# Patient Record
Sex: FEMALE | Race: ASIAN | NOT HISPANIC OR LATINO
[De-identification: names, ages, dates, MRNs, and addresses within clinical notes are randomized per-mention and may not be internally consistent; named-entity substitution may affect disease eponyms.]

---

## 2019-03-27 ENCOUNTER — APPOINTMENT (OUTPATIENT)
Dept: SURGICAL ONCOLOGY | Facility: CLINIC | Age: 59
End: 2019-03-27
Payer: MEDICAID

## 2019-03-27 VITALS
WEIGHT: 135 LBS | DIASTOLIC BLOOD PRESSURE: 90 MMHG | OXYGEN SATURATION: 96 % | TEMPERATURE: 97.8 F | SYSTOLIC BLOOD PRESSURE: 171 MMHG | BODY MASS INDEX: 25.49 KG/M2 | RESPIRATION RATE: 16 BRPM | HEART RATE: 62 BPM | HEIGHT: 61 IN

## 2019-03-27 DIAGNOSIS — Z78.9 OTHER SPECIFIED HEALTH STATUS: ICD-10-CM

## 2019-03-27 DIAGNOSIS — Z87.39 PERSONAL HISTORY OF OTHER DISEASES OF THE MUSCULOSKELETAL SYSTEM AND CONNECTIVE TISSUE: ICD-10-CM

## 2019-03-27 DIAGNOSIS — Z86.59 PERSONAL HISTORY OF OTHER MENTAL AND BEHAVIORAL DISORDERS: ICD-10-CM

## 2019-03-27 DIAGNOSIS — Z85.828 PERSONAL HISTORY OF OTHER MALIGNANT NEOPLASM OF SKIN: ICD-10-CM

## 2019-03-27 DIAGNOSIS — K31.9 DISEASE OF STOMACH AND DUODENUM, UNSPECIFIED: ICD-10-CM

## 2019-03-27 DIAGNOSIS — Z80.0 FAMILY HISTORY OF MALIGNANT NEOPLASM OF DIGESTIVE ORGANS: ICD-10-CM

## 2019-03-27 DIAGNOSIS — Z86.79 PERSONAL HISTORY OF OTHER DISEASES OF THE CIRCULATORY SYSTEM: ICD-10-CM

## 2019-03-27 PROBLEM — Z00.00 ENCOUNTER FOR PREVENTIVE HEALTH EXAMINATION: Status: ACTIVE | Noted: 2019-03-27

## 2019-03-27 PROCEDURE — 99245 OFF/OP CONSLTJ NEW/EST HI 55: CPT

## 2019-03-27 RX ORDER — PNV NO.95/FERROUS FUM/FOLIC AC 28MG-0.8MG
500-400 TABLET ORAL
Refills: 0 | Status: ACTIVE | COMMUNITY

## 2019-03-27 NOTE — ASSESSMENT
[FreeTextEntry1] : Impression:\par Newly diagnosed angiosarcoma of Right parietal scalp\par \par \par Plan:\par Wide local excision with skin graft closure\par

## 2019-03-27 NOTE — HISTORY OF PRESENT ILLNESS
[de-identified] : Ms. Peralta is a 59 year old female who presents today for an initial consultation.  She  was referred by Dr. Yamilka Ibrahim.\par \par She first noticed a lesion on her parietal scalp approximately 1 year ago denying pruritus or drainage.  Her son prompted her to see a dermatologist after noticing that the lesion felt hard and was tender to palpation.  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  \par \par No significant PMH.\par Denies family history of skin cancers.  Father with history of gastric cancer.\par \par Internist:  Dr. Robinson Ibrahim\par Derm: Rob Peralta

## 2019-03-27 NOTE — PHYSICAL EXAM
[Normal] : supple, no neck mass and thyroid not enlarged [Normal Neck Lymph Nodes] : normal neck lymph nodes  [Normal Supraclavicular Lymph Nodes] : normal supraclavicular lymph nodes [Normal] : oriented to person, place and time, with appropriate affect [FreeTextEntry1] : Deferred  [de-identified] : Right parietal scalp s/p biopsy.   Some residual superiorly. no sign of infection

## 2019-03-27 NOTE — CONSULT LETTER
[Dear  ___] : Dear  [unfilled], [Consult Letter:] : I had the pleasure of evaluating your patient, [unfilled]. [Please see my note below.] : Please see my note below. [Consult Closing:] : Thank you very much for allowing me to participate in the care of this patient.  If you have any questions, please do not hesitate to contact me. [Sincerely,] : Sincerely, [DrMaria G  ___] : Dr. MATA [DrMaria G ___] : Dr. MATA [FreeTextEntry1] : I will keep you informed of the final pathology. [FreeTextEntry3] : Yadiel Calixto MD FACS\par Chief of Surgical Oncology\par \par

## 2019-04-03 ENCOUNTER — OUTPATIENT (OUTPATIENT)
Dept: OUTPATIENT SERVICES | Facility: HOSPITAL | Age: 59
LOS: 1 days | End: 2019-04-03
Payer: COMMERCIAL

## 2019-04-03 DIAGNOSIS — C43.9 MALIGNANT MELANOMA OF SKIN, UNSPECIFIED: ICD-10-CM

## 2019-04-04 ENCOUNTER — OUTPATIENT (OUTPATIENT)
Dept: OUTPATIENT SERVICES | Facility: HOSPITAL | Age: 59
LOS: 1 days | End: 2019-04-04
Payer: COMMERCIAL

## 2019-04-04 ENCOUNTER — RESULT REVIEW (OUTPATIENT)
Age: 59
End: 2019-04-04

## 2019-04-04 ENCOUNTER — APPOINTMENT (OUTPATIENT)
Dept: PLASTIC SURGERY | Facility: CLINIC | Age: 59
End: 2019-04-04
Payer: MEDICAID

## 2019-04-04 VITALS
HEIGHT: 61 IN | SYSTOLIC BLOOD PRESSURE: 100 MMHG | RESPIRATION RATE: 16 BRPM | WEIGHT: 134.92 LBS | HEART RATE: 61 BPM | OXYGEN SATURATION: 99 % | DIASTOLIC BLOOD PRESSURE: 60 MMHG | TEMPERATURE: 99 F

## 2019-04-04 VITALS
WEIGHT: 135 LBS | SYSTOLIC BLOOD PRESSURE: 130 MMHG | HEIGHT: 61 IN | OXYGEN SATURATION: 98 % | TEMPERATURE: 97.8 F | DIASTOLIC BLOOD PRESSURE: 77 MMHG | BODY MASS INDEX: 25.49 KG/M2 | HEART RATE: 59 BPM

## 2019-04-04 DIAGNOSIS — Z98.51 TUBAL LIGATION STATUS: Chronic | ICD-10-CM

## 2019-04-04 DIAGNOSIS — Z01.818 ENCOUNTER FOR OTHER PREPROCEDURAL EXAMINATION: ICD-10-CM

## 2019-04-04 DIAGNOSIS — Z80.1 FAMILY HISTORY OF MALIGNANT NEOPLASM OF TRACHEA, BRONCHUS AND LUNG: ICD-10-CM

## 2019-04-04 DIAGNOSIS — C49.0 MALIGNANT NEOPLASM OF CONNECTIVE AND SOFT TISSUE OF HEAD, FACE AND NECK: ICD-10-CM

## 2019-04-04 PROCEDURE — 88321 CONSLTJ&REPRT SLD PREP ELSWR: CPT

## 2019-04-04 PROCEDURE — 99244 OFF/OP CNSLTJ NEW/EST MOD 40: CPT

## 2019-04-04 PROCEDURE — G0463: CPT

## 2019-04-04 RX ORDER — CHLORHEXIDINE GLUCONATE 213 G/1000ML
1 SOLUTION TOPICAL DAILY
Qty: 0 | Refills: 0 | Status: DISCONTINUED | OUTPATIENT
Start: 2019-04-10 | End: 2019-04-25

## 2019-04-04 RX ORDER — SODIUM CHLORIDE 9 MG/ML
3 INJECTION INTRAMUSCULAR; INTRAVENOUS; SUBCUTANEOUS EVERY 8 HOURS
Qty: 0 | Refills: 0 | Status: DISCONTINUED | OUTPATIENT
Start: 2019-04-10 | End: 2019-04-25

## 2019-04-04 RX ORDER — LIDOCAINE HCL 20 MG/ML
0.2 VIAL (ML) INJECTION ONCE
Qty: 0 | Refills: 0 | Status: DISCONTINUED | OUTPATIENT
Start: 2019-04-10 | End: 2019-04-25

## 2019-04-04 NOTE — H&P PST ADULT - NSICDXFAMILYHX_GEN_ALL_CORE_FT
FAMILY HISTORY:  Family history of gastric cancer, Father @ age 40  Family history of lung cancer, Mother

## 2019-04-04 NOTE — HISTORY OF PRESENT ILLNESS
[FreeTextEntry1] : This is a silas 58 yo F who presents to the office for an initial consultation at the request of Dr. Calixto regarding an angiosarcoma of the right scalp. Pt is accompanied with her son today.\par Pt reports that for about a year, she had a small bump on the right scalp but she did not seek immediate medical treatment due to no symptoms. She had the area biopsied on the right scalp with her Dermatologist in Jan 2019 and was told that it was benign. She states that she developed some redness and pain at the site recently so she had a second biopsy of the area which showed angiosarcoma. She was then referred to Dr. Calixto and will be proceeding with excision. She was referred to plastic surgery for reconstructive options. \par Pt does not take any anticoagulants, she is a non-smoker.\par Pt denies history of skin cancer, denies family history of skin cancer.\par Otherwise, no other complaints or concerns; denies fever, sweats, or chills.

## 2019-04-04 NOTE — CONSULT LETTER
[Dear  ___] : Dear  [unfilled], [Consult Letter:] : I had the pleasure of evaluating your patient, [unfilled]. [( Thank you for referring [unfilled] for consultation for _____ )] : Thank you for referring [unfilled] for consultation for [unfilled] [Please see my note below.] : Please see my note below. [Consult Closing:] : Thank you very much for allowing me to participate in the care of this patient.  If you have any questions, please do not hesitate to contact me. [Sincerely,] : Sincerely, [FreeTextEntry3] : Ryan Valadez MD

## 2019-04-04 NOTE — ASSESSMENT
[FreeTextEntry1] : 58 yo F w/ no pertinent PMH who presents to the office for an initial consultation at the request of Dr. Calixto regarding an angiosarcoma of the right scalp. \par \par I explained that following excision there will be a full thickness defect of the involved area.  The reconstructive options will be based on the defect size and surrounding tissue laxity of the involved area.  Primary closure is only possible for smaller defects.  \par For larger defects, local tissue rearrangement or skin grafting may be necessary.  The patient was explained the risks of each option. Risks following layered primary closure or local tissue rearrangement include wound dehiscence, contour irregularity, bleeding, infection, and paraesthesias.  Risks following skin grafting include wound dehiscence, skin graft nonadherence (partial or complete), contour irregularity, bleeding, infection, paraesthesias, and donor site complications.  All questions were answered; the patient fully understands the risks and plan and would like to proceed with the above.\par \par Surgery will be planned in accordance with Dr. Calixto (surgical oncology); surgical paperwork to be submitted.

## 2019-04-04 NOTE — ADDENDUM
[FreeTextEntry1] : All medical record entries made were at my, ROSA PRESTON MD, direction and personally dictated by me. I have reviewed the chart and agree that the record accurately reflects my personal performance of the history, physical exam, assessment, and plan.

## 2019-04-04 NOTE — REASON FOR VISIT
[Consultation] : a consultation visit [Family Member] : family member [FreeTextEntry1] : Dr. aYdiel Calixto

## 2019-04-04 NOTE — PHYSICAL EXAM
[de-identified] : The patient is ambulatory, well groomed, no signs of cachexia. [de-identified] : Normocephalic, atraumatic. [de-identified] : No conjunctival erythema, hyperemia, or discharge bilaterally; no ectropion, entropion, lagophthalmos, or lid malposition bilaterally. Pupils are equal, round, and reactive to light bilaterally. [de-identified] : There are no masses, scars, or lesions of the external ear. External nose is midline with no scars or masses. Gross hearing intact bilaterally (finger rub). Nasal mucosa has no edema, lesions, or signs of desiccation. Lips and gums have no masses, lesions, friability, or edema. [de-identified] : Neck is soft without edema, masses, or crepitus. Trachea midline. No thyromegaly; no palpable thyroid nodules. [de-identified] : No sign of respiratory distress, no tachypnea, no use of accessory respiratory muscles. [de-identified] : No swelling, tenderness, or varicosities of the extremities bilaterally; extremities are warm to palpation and pedal pulses intact. [de-identified] : No hepatomegaly or splenomegaly. No abdominal wall tenderness or masses on palpation. No abdominal wall hernias noted. [de-identified] : On examination, patient has normal gait and station.\par  [de-identified] : Right Posterior Scalp:\par well healed biopsy sites noted,\par no open areas, no drainage or bleeding,\par no ulceration,\par no pigmentation noted,\par no regional LAD noted.\par  [de-identified] : CN 2-12 are intact, no sensory disturbances noted (e.g. by touch) [de-identified] : The patient is alert and oriented to time, place, and person. No obvious disorder of mood or affect such as anxiety or agitation.

## 2019-04-04 NOTE — H&P PST ADULT - NSANTHOSAYNRD_GEN_A_CORE
No. CAITY screening performed.  STOP BANG Legend: 0-2 = LOW Risk; 3-4 = INTERMEDIATE Risk; 5-8 = HIGH Risk

## 2019-04-04 NOTE — H&P PST ADULT - HISTORY OF PRESENT ILLNESS
This is a 59 year old female with PMH: s/p (1/2019): Excision Posterior Scalp Mass: dx: atypical cells. Wider Excision done (3/2019):dx: Angiosarcoma. Now scheduled: Excision of Sarcoma of Scalp with Skin Graft.

## 2019-04-04 NOTE — H&P PST ADULT - NSICDXPROBLEM_GEN_ALL_CORE_FT
PROBLEM DIAGNOSES  Problem: Angiosarcoma of scalp  Assessment and Plan: Excision of Sarcoma of Scalp with Skin Graft

## 2019-04-04 NOTE — H&P PST ADULT - NSICDXPASTMEDICALHX_GEN_ALL_CORE_FT
PAST MEDICAL HISTORY:  Angiosarcoma of scalp dx: 3/2019    Multiparity      (normal spontaneous vaginal delivery) X 2

## 2019-04-05 PROBLEM — C49.0 MALIGNANT NEOPLASM OF CONNECTIVE AND SOFT TISSUE OF HEAD, FACE AND NECK: Chronic | Status: ACTIVE | Noted: 2019-04-04

## 2019-04-05 LAB — SURGICAL PATHOLOGY STUDY: SIGNIFICANT CHANGE UP

## 2019-04-08 PROBLEM — Z64.1 PROBLEMS RELATED TO MULTIPARITY: Chronic | Status: ACTIVE | Noted: 2019-04-04

## 2019-04-10 ENCOUNTER — OUTPATIENT (OUTPATIENT)
Dept: OUTPATIENT SERVICES | Facility: HOSPITAL | Age: 59
LOS: 1 days | End: 2019-04-10
Payer: COMMERCIAL

## 2019-04-10 ENCOUNTER — APPOINTMENT (OUTPATIENT)
Dept: SURGICAL ONCOLOGY | Facility: HOSPITAL | Age: 59
End: 2019-04-10

## 2019-04-10 ENCOUNTER — RESULT REVIEW (OUTPATIENT)
Age: 59
End: 2019-04-10

## 2019-04-10 VITALS
HEART RATE: 57 BPM | TEMPERATURE: 98 F | OXYGEN SATURATION: 97 % | DIASTOLIC BLOOD PRESSURE: 81 MMHG | RESPIRATION RATE: 16 BRPM | SYSTOLIC BLOOD PRESSURE: 127 MMHG | HEIGHT: 61 IN | WEIGHT: 134.92 LBS

## 2019-04-10 VITALS
OXYGEN SATURATION: 99 % | SYSTOLIC BLOOD PRESSURE: 122 MMHG | DIASTOLIC BLOOD PRESSURE: 65 MMHG | HEART RATE: 88 BPM | RESPIRATION RATE: 22 BRPM

## 2019-04-10 DIAGNOSIS — C49.0 MALIGNANT NEOPLASM OF CONNECTIVE AND SOFT TISSUE OF HEAD, FACE AND NECK: ICD-10-CM

## 2019-04-10 DIAGNOSIS — Z01.818 ENCOUNTER FOR OTHER PREPROCEDURAL EXAMINATION: ICD-10-CM

## 2019-04-10 DIAGNOSIS — Z98.51 TUBAL LIGATION STATUS: Chronic | ICD-10-CM

## 2019-04-10 PROCEDURE — 15200 FTH/GFT FR TRNK 20 SQ CM/<: CPT

## 2019-04-10 PROCEDURE — 88305 TISSUE EXAM BY PATHOLOGIST: CPT

## 2019-04-10 PROCEDURE — 15220 FTH/GFT FR S/A/L 20 SQ CM/<: CPT

## 2019-04-10 PROCEDURE — 15221 FTH/GFT FR S/A/L EACH ADDL: CPT

## 2019-04-10 PROCEDURE — 21015 RESECT FACE/SCALP TUM < 2 CM: CPT

## 2019-04-10 PROCEDURE — 88305 TISSUE EXAM BY PATHOLOGIST: CPT | Mod: 26

## 2019-04-10 PROCEDURE — 11626 EXC S/N/H/F/G MAL+MRG >4 CM: CPT

## 2019-04-10 RX ORDER — ACETAMINOPHEN 500 MG
1000 TABLET ORAL ONCE
Qty: 0 | Refills: 0 | Status: COMPLETED | OUTPATIENT
Start: 2019-04-10 | End: 2019-04-10

## 2019-04-10 RX ORDER — OXYCODONE HYDROCHLORIDE 5 MG/1
10 TABLET ORAL ONCE
Qty: 0 | Refills: 0 | Status: DISCONTINUED | OUTPATIENT
Start: 2019-04-10 | End: 2019-04-10

## 2019-04-10 RX ORDER — CEPHALEXIN 500 MG
1 CAPSULE ORAL
Qty: 14 | Refills: 0
Start: 2019-04-10 | End: 2019-04-16

## 2019-04-10 RX ORDER — CELECOXIB 200 MG/1
200 CAPSULE ORAL ONCE
Qty: 0 | Refills: 0 | Status: DISCONTINUED | OUTPATIENT
Start: 2019-04-10 | End: 2019-04-25

## 2019-04-10 RX ORDER — OXYCODONE HYDROCHLORIDE 5 MG/1
5 TABLET ORAL ONCE
Qty: 0 | Refills: 0 | Status: DISCONTINUED | OUTPATIENT
Start: 2019-04-10 | End: 2019-04-10

## 2019-04-10 RX ORDER — OXYCODONE AND ACETAMINOPHEN 5; 325 MG/1; MG/1
1 TABLET ORAL
Qty: 20 | Refills: 0
Start: 2019-04-10 | End: 2019-04-14

## 2019-04-10 RX ORDER — CELECOXIB 200 MG/1
200 CAPSULE ORAL ONCE
Qty: 0 | Refills: 0 | Status: COMPLETED | OUTPATIENT
Start: 2019-04-10 | End: 2019-04-10

## 2019-04-10 RX ORDER — ONDANSETRON 8 MG/1
4 TABLET, FILM COATED ORAL ONCE
Qty: 0 | Refills: 0 | Status: DISCONTINUED | OUTPATIENT
Start: 2019-04-10 | End: 2019-04-25

## 2019-04-10 RX ORDER — SODIUM CHLORIDE 9 MG/ML
1000 INJECTION, SOLUTION INTRAVENOUS
Qty: 0 | Refills: 0 | Status: DISCONTINUED | OUTPATIENT
Start: 2019-04-10 | End: 2019-04-25

## 2019-04-10 RX ORDER — CEFAZOLIN SODIUM 1 G
2000 VIAL (EA) INJECTION ONCE
Qty: 0 | Refills: 0 | Status: COMPLETED | OUTPATIENT
Start: 2019-04-10 | End: 2019-04-10

## 2019-04-10 RX ADMIN — CELECOXIB 200 MILLIGRAM(S): 200 CAPSULE ORAL at 06:16

## 2019-04-10 RX ADMIN — Medication 1000 MILLIGRAM(S): at 06:16

## 2019-04-10 NOTE — BRIEF OPERATIVE NOTE - NSICDXBRIEFPOSTOP_GEN_ALL_CORE_FT
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

## 2019-04-10 NOTE — ASU DISCHARGE PLAN (ADULT/PEDIATRIC) - ASU DC SPECIAL INSTRUCTIONSFT
1. PLEASE FOLLOW UP WITH DR. PRESTON'S PHYSICIAN ASSISTANT VISHNU NEXT WEEK THURSDAY MORNING 04/18/19. CALL [phone] FOR APPT TIME.  2. DO NOT SLEEP ON THE RIGHT SIDE. THE DRESSING IS VERY IMPORTANT SO PLEASE BE CAREFUL TO NOT MOVE IT. PLEASE KEEP THIS DRY. YOU MAY SHOWER NORMALLY NECK DOWN - MUST KEEP THE SCALP DRY.  3. PLEASE TAKE THE ANTIBIOTICS AND PAIN MEDS AS DIRECTED.  4. YOU MAY HAVE SOME DRAINAGE/OOZING/AND BLOOD FROM SCALP - THIS IS NORMAL.

## 2019-04-10 NOTE — PACU DISCHARGE NOTE - COMMENTS
Patient was advised through son  to follow up with PCP regarding PVCs.   VSS, no symptoms.  If any symptoms to go to ER . Patient/son  understood and agreed.

## 2019-04-10 NOTE — BRIEF OPERATIVE NOTE - NSICDXBRIEFPREOP_GEN_ALL_CORE_FT
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

## 2019-04-10 NOTE — BRIEF OPERATIVE NOTE - OPERATION/FINDINGS
Wide local excision of previously biopsied angiosarcoma of scalp.    Specimens:  1) Sarcoma of scalp, short stitch superior, long stitch left
SCALP WOUND ON RIGHT POSTERIOR S/P EXCISION OF SARCOMA (6X5)  FTSG FROM THE RIGHT GROIN TO SCALP  BOLSTER DRESSING APPLIED

## 2019-04-10 NOTE — ASU DISCHARGE PLAN (ADULT/PEDIATRIC) - CARE PROVIDER_API CALL
Ryan Valadez (MD)  ColonRectal Surgery; Plastic Surgery; Surgery; Surgery of the Hand  [address] [facility], [address]  [place] [address]  Phone: [phone]  Fax: [phone]  Follow Up Time:     Yadiel Calixto)  Surgery  [address] [place] [id number]  Phone: [phone]  Fax: [phone]  Follow Up Time:

## 2019-04-10 NOTE — ASU DISCHARGE PLAN (ADULT/PEDIATRIC) - CALL YOUR DOCTOR IF YOU HAVE ANY OF THE FOLLOWING:
Bleeding that does not stop/Fever greater than (need to indicate Fahrenheit or Celsius)/Swelling that gets worse/Pain not relieved by Medications/Wound/Surgical Site with redness, or foul smelling discharge or pus/Unable to urinate/Excessive diarrhea/[phone]

## 2019-04-10 NOTE — ASU DISCHARGE PLAN (ADULT/PEDIATRIC) - ACTIVITY LEVEL
DO NOT SLEEP ON THE RIGHT SIDE OF HEAD/No heavy lifting/No sports/gym/No excercise/No weight bearing

## 2019-04-10 NOTE — BRIEF OPERATIVE NOTE - NSICDXBRIEFPROCEDURE_GEN_ALL_CORE_FT
PROCEDURES:  Excision of malignant lesion of scalp over 4.0cm in diameter, including margins 10-Apr-2019 07:55:55  Robina Le
PROCEDURES:  Full-thickness skin graft to head or neck 10-Apr-2019 08:35:12  Tiffanie Ocampo

## 2019-04-17 LAB — SURGICAL PATHOLOGY STUDY: SIGNIFICANT CHANGE UP

## 2019-04-18 ENCOUNTER — APPOINTMENT (OUTPATIENT)
Dept: PLASTIC SURGERY | Facility: CLINIC | Age: 59
End: 2019-04-18
Payer: MEDICAID

## 2019-04-18 VITALS
TEMPERATURE: 97.4 F | HEIGHT: 61 IN | WEIGHT: 135 LBS | DIASTOLIC BLOOD PRESSURE: 72 MMHG | HEART RATE: 65 BPM | SYSTOLIC BLOOD PRESSURE: 143 MMHG | BODY MASS INDEX: 25.49 KG/M2 | OXYGEN SATURATION: 99 %

## 2019-04-18 PROCEDURE — 99024 POSTOP FOLLOW-UP VISIT: CPT

## 2019-04-22 NOTE — HISTORY OF PRESENT ILLNESS
[FreeTextEntry1] : 60 yo F who presents for post op visit s/p FTSG reconstruction of right posterior scalp following excision of angiosarcoma DOS: 04/10/19. Pt doing well, here with her son. She denies much pain. Her dressings are all intact, she has kept scalp dry. Otherwise, no other complaints or concerns; denies fever, sweats, chills.

## 2019-04-22 NOTE — ASSESSMENT
[FreeTextEntry1] : 58 yo F who presents for post op visit s/p FTSG reconstruction of right posterior scalp following excision of angiosarcoma DOS: 04/10/19.\par \par FTSG taking well, no sign of infection.\par - Bolster removed\par - Pt may take quick shower, gentle w/ right posterior scalp\par - Leave open to air\par - Follow up in 1 week

## 2019-04-22 NOTE — PHYSICAL EXAM
[de-identified] : Scalp - \par bolster is intact,\par FTSG is viable and taking well,\par Donor site incision is CDI, no erythema, no gaps, no drainage noted. \par No sign of active infection.\par

## 2019-04-23 ENCOUNTER — APPOINTMENT (OUTPATIENT)
Age: 59
End: 2019-04-23
Payer: MEDICAID

## 2019-04-23 VITALS
TEMPERATURE: 98.2 F | SYSTOLIC BLOOD PRESSURE: 124 MMHG | BODY MASS INDEX: 24.84 KG/M2 | HEIGHT: 62 IN | HEART RATE: 63 BPM | WEIGHT: 135 LBS | OXYGEN SATURATION: 97 % | DIASTOLIC BLOOD PRESSURE: 75 MMHG

## 2019-04-23 PROCEDURE — 99024 POSTOP FOLLOW-UP VISIT: CPT

## 2019-04-23 NOTE — HISTORY OF PRESENT ILLNESS
[FreeTextEntry1] : 60 yo F who presents for post op visit s/p FTSG reconstruction of right posterior scalp following excision of angiosarcoma DOS: 04/10/19. Pt doing well, here with her son. Pt has some discomfort and feels the stitches there that are poking the skin. She inquires about shampooing and which ointments she can use. Otherwise, no other complaints or concerns; denies fever, sweats, chills.

## 2019-04-23 NOTE — ASSESSMENT
[FreeTextEntry1] : 58 yo F who presents for post op visit s/p FTSG reconstruction of right posterior scalp following excision of angiosarcoma DOS: 04/10/19.\par \par Pt doing well; incisions are healing well and the FTSG taking well, no sign of infection.\par - Some sutures were trimmed, dissolvable \par - Pt may get area wet now, shampoo/wash normally\par - The patient was encouraged to massage the incision site 2-3 times per day for 8-10 minutes with lotion, vitamin E, or cocoa butter to encourage blood flow and to decrease scar tissue formation.\par - Follow up 2 weeks.

## 2019-04-23 NOTE — PHYSICAL EXAM
[de-identified] : Scalp - \par FTSG is viable and taking well,\par Donor site incision is CDI, no erythema, no gaps, no drainage noted. \par No sign of active infection.\par

## 2019-04-23 NOTE — REASON FOR VISIT
[Post Op: _________] : a [unfilled] post op visit [Family Member] : family member [FreeTextEntry1] : DOS: 04/10/2019 s/p FTSG reconstruction of right posterior scalp following excision of angiosarcoma. Patient alesha right side scalp pain. Patient describes pain 4/10.

## 2019-04-29 ENCOUNTER — APPOINTMENT (OUTPATIENT)
Dept: SURGICAL ONCOLOGY | Facility: CLINIC | Age: 59
End: 2019-04-29
Payer: MEDICAID

## 2019-04-29 VITALS
SYSTOLIC BLOOD PRESSURE: 130 MMHG | OXYGEN SATURATION: 97 % | HEART RATE: 69 BPM | TEMPERATURE: 97.8 F | DIASTOLIC BLOOD PRESSURE: 76 MMHG

## 2019-04-29 PROCEDURE — 99024 POSTOP FOLLOW-UP VISIT: CPT

## 2019-04-29 NOTE — HISTORY OF PRESENT ILLNESS
[de-identified] : Ms. Peralta is a 59 year old female who presents today for an initial post op visit, s/p wide excision of angiosarcoma with FTSG reconstruction (Dr. Valadez) on 4/10/19.   Final pathology revealed a 0.6 cm well differentiated angiosarcoma, mitotic rate 0/10 HPF, negative margins, 0/6 lymph nodes.  Today she is with c/o occasional discomfort at the healing graft site, numbness to the scalp. Denies fever or chills. \par \par \par PERTINENT HISTORY:\par Referred by Dr. Yamilka Ibrahim.  She first noticed a lesion on her parietal scalp approximately 1 year ago denying pruritus or drainage.  Her son prompted her to see a dermatologist after noticing that the lesion felt hard and was tender to palpation.  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  \par \par No significant PMH.\par Denies family history of skin cancers.  Father with history of gastric cancer.\par \par Internist:  Dr. Robinson Ibrahim\par Derm: Rob Peralta

## 2019-04-29 NOTE — PHYSICAL EXAM
[Normal] : well developed, well nourished, in no acute distress [de-identified] : healing right scalp graft site with no evidence of infection

## 2019-04-29 NOTE — CONSULT LETTER
[Dear  ___] : Dear  [unfilled], [Consult Letter:] : I had the pleasure of evaluating your patient, [unfilled]. [Please see my note below.] : Please see my note below. [Consult Closing:] : Thank you very much for allowing me to participate in the care of this patient.  If you have any questions, please do not hesitate to contact me. [Sincerely,] : Sincerely, [FreeTextEntry1] : She will follow-up with you.\par \par I will keep you informed of my follow-up. [FreeTextEntry3] : Yadiel Calixto MD FACS\par Chief of Surgical Oncology\par \par  [DrMaria G  ___] : Dr. MATA [DrMaria G ___] : Dr. MATA

## 2019-04-29 NOTE — ASSESSMENT
[FreeTextEntry1] : Impression:\par Newly diagnosed angiosarcoma of Right parietal scalp\par S/p wide excision of angiosarcoma with FTSG reconstruction (Dr. Valadez) on 4/10/19\par Final path = 0.6 cm well differentiated angiosarcoma, mitotic rate 0/10 HPF, negative margins, 0/6 lymph nodes. \par \par \par Plan:\par Continue f/u with Dr. Valadez\par Follow-up with Dr. Ibrahim\par Radiation evaluation by Dr. Allyn Milligan\par RTO 3 months

## 2019-05-09 ENCOUNTER — APPOINTMENT (OUTPATIENT)
Age: 59
End: 2019-05-09
Payer: MEDICAID

## 2019-05-09 PROCEDURE — 99024 POSTOP FOLLOW-UP VISIT: CPT

## 2019-06-06 NOTE — REASON FOR VISIT
[Initial Consultation] : an initial consultation for [Skin Cancer] : skin caner [Other: _____] : [unfilled] No

## 2019-06-24 NOTE — HISTORY OF PRESENT ILLNESS
[FreeTextEntry1] : 58 yo F who presents for post op visit s/p FTSG reconstruction of right posterior scalp following excision of angiosarcoma DOS: 04/10/19. Pt doing well, here with her son. Pt doing much better, pain and discomfort greatly improved. Massages the scar daily. Otherwise, no other complaints or concerns; denies fever, sweats, chills.

## 2019-06-24 NOTE — PHYSICAL EXAM
[de-identified] : Scalp - \par FTSG is well taken, \par Donor site incision is well healed.\par No sign of active infection.\par

## 2019-06-24 NOTE — ASSESSMENT
[FreeTextEntry1] : 60 yo F who presents for post op visit s/p FTSG reconstruction of right posterior scalp following excision of angiosarcoma DOS: 04/10/19.\par \par Pt doing well; FTSG well taken\par - The patient was encouraged to massage the incision site 2-3 times per day for 8-10 minutes with lotion, vitamin E, or cocoa butter to encourage blood flow and to decrease scar tissue formation.\par - Follow up regularly w/ Dermatologist\par - Follow up PRN

## 2019-07-31 ENCOUNTER — APPOINTMENT (OUTPATIENT)
Dept: SURGICAL ONCOLOGY | Facility: CLINIC | Age: 59
End: 2019-07-31
Payer: MEDICAID

## 2019-07-31 VITALS
DIASTOLIC BLOOD PRESSURE: 82 MMHG | TEMPERATURE: 97.8 F | HEIGHT: 62 IN | RESPIRATION RATE: 16 BRPM | BODY MASS INDEX: 26.13 KG/M2 | SYSTOLIC BLOOD PRESSURE: 145 MMHG | HEART RATE: 62 BPM | OXYGEN SATURATION: 96 % | WEIGHT: 142 LBS

## 2019-07-31 PROCEDURE — 99214 OFFICE O/P EST MOD 30 MIN: CPT

## 2019-07-31 NOTE — HISTORY OF PRESENT ILLNESS
[de-identified] : Ms. Peralta is a 59 year old female who presents today for her follow up visit.  She is s/p wide excision of angiosarcoma with FTSG reconstruction (Dr. Valadez) on 4/10/19.   Final pathology revealed a 0.6 cm well differentiated angiosarcoma, mitotic rate 0/10 HPF, negative margins, 0/6 lymph nodes. \par She completed XRT on 7/12/19 under the guidance of Dr. Steve Milligan.  Today she feels generally well and continues to experience numbness to the right scalp.\par \par PERTINENT HISTORY:\par Referred by Dr. Yamilka Ibrahim.  She first noticed a lesion on her parietal scalp approximately 1 year ago denying pruritus or drainage.  Her son prompted her to see a dermatologist after noticing that the lesion felt hard and was tender to palpation.  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  \par \par No significant PMH.\par Denies family history of skin cancers.  Father with history of gastric cancer.\par \par Internist:  Dr. Yaritza Ibrahim [phone]\par Derm: Rob Peralta

## 2019-07-31 NOTE — ASSESSMENT
[FreeTextEntry1] : Impression:\par [place] - S/p wide excision of angiosarcoma of scalp with FTSG reconstruction (Dr. Valadez) on 4/10/19\par Completed XRT 7/12/19\par \par Plan:\par CT chest Oct 2019; then q 6months\par RTO in 3 months\par \par

## 2019-07-31 NOTE — PHYSICAL EXAM
[Normal] : supple, no neck mass and thyroid not enlarged [Normal Supraclavicular Lymph Nodes] : normal supraclavicular lymph nodes [Normal] : oriented to person, place and time, with appropriate affect [Normal Neck Lymph Nodes] : normal neck lymph nodes  [de-identified] : Right lower posterior scalp excision site well healed without signs of recurrence.  Mild hyperpigmentation changes secondary to XRT. [FreeTextEntry1] : Deferred

## 2019-07-31 NOTE — REASON FOR VISIT
[Follow-Up Visit] : a follow-up visit for [Family Member] : family member [FreeTextEntry2] : angiosarcoma of scalp.

## 2019-10-08 ENCOUNTER — FORM ENCOUNTER (OUTPATIENT)
Age: 59
End: 2019-10-08

## 2019-10-09 ENCOUNTER — OUTPATIENT (OUTPATIENT)
Dept: OUTPATIENT SERVICES | Facility: HOSPITAL | Age: 59
LOS: 1 days | End: 2019-10-09
Payer: COMMERCIAL

## 2019-10-09 ENCOUNTER — APPOINTMENT (OUTPATIENT)
Dept: CT IMAGING | Facility: IMAGING CENTER | Age: 59
End: 2019-10-09
Payer: MEDICAID

## 2019-10-09 DIAGNOSIS — C49.0 MALIGNANT NEOPLASM OF CONNECTIVE AND SOFT TISSUE OF HEAD, FACE AND NECK: ICD-10-CM

## 2019-10-09 DIAGNOSIS — Z98.51 TUBAL LIGATION STATUS: Chronic | ICD-10-CM

## 2019-10-09 PROCEDURE — 71260 CT THORAX DX C+: CPT

## 2019-10-09 PROCEDURE — 71260 CT THORAX DX C+: CPT | Mod: 26

## 2019-10-30 ENCOUNTER — APPOINTMENT (OUTPATIENT)
Dept: SURGICAL ONCOLOGY | Facility: CLINIC | Age: 59
End: 2019-10-30
Payer: MEDICAID

## 2019-10-30 VITALS
WEIGHT: 142 LBS | HEIGHT: 62 IN | TEMPERATURE: 97.8 F | OXYGEN SATURATION: 96 % | HEART RATE: 62 BPM | DIASTOLIC BLOOD PRESSURE: 71 MMHG | RESPIRATION RATE: 16 BRPM | BODY MASS INDEX: 26.13 KG/M2 | SYSTOLIC BLOOD PRESSURE: 118 MMHG

## 2019-10-30 PROCEDURE — 99214 OFFICE O/P EST MOD 30 MIN: CPT

## 2019-10-30 NOTE — HISTORY OF PRESENT ILLNESS
[de-identified] : Ms. Peralta is a 59 year old female who presents today for her follow up visit.  She is s/p wide excision of angiosarcoma with FTSG reconstruction (Dr. Valadez) on 4/10/19.   Final pathology revealed a 0.6 cm well differentiated angiosarcoma, mitotic rate 0/10 HPF, negative margins, 0/6 lymph nodes. \par She completed XRT on 7/12/19 under the guidance of Dr. Steve Milligan.  Today she feels generally well and continues to experience numbness to the right scalp.\par \par CT chest 10/9/19 without signs of metastatic disease.  Today she feels generally well without complaint.\par She underwent a endoscopy/ colonoscopy with Dr. Payne and was noted with H-pylori.  She has yet to start taking her regimen as she is currently taking Augmentin for sinusitis. \par \par PERTINENT HISTORY:\par Referred by Dr. Yamilka Ibrahim.  She first noticed a lesion on her parietal scalp approximately 1 year ago denying pruritus or drainage.  Her son prompted her to see a dermatologist after noticing that the lesion felt hard and was tender to palpation.  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  \par \par No significant PMH.\par Denies family history of skin cancers.  Father with history of gastric cancer.\par \par Internist:  Dr. Yaritza Ibrahim [phone]\par Derm: Rob Peralta

## 2019-10-30 NOTE — CONSULT LETTER
[Dear  ___] : Dear  [unfilled], [Courtesy Letter:] : I had the pleasure of seeing your patient, [unfilled], in my office today. [Please see my note below.] : Please see my note below. [Consult Closing:] : Thank you very much for allowing me to participate in the care of this patient.  If you have any questions, please do not hesitate to contact me. [Sincerely,] : Sincerely, [FreeTextEntry1] : I will keep you informed of my follow-up. [FreeTextEntry3] : Yadiel Calixto MD FACS\par Chief of Surgical Oncology\par \par  [DrMaria G  ___] : Dr. MATA [DrMaria G ___] : Dr. MATA

## 2019-10-30 NOTE — PHYSICAL EXAM
[Normal] : supple, no neck mass and thyroid not enlarged [Normal Neck Lymph Nodes] : normal neck lymph nodes  [Normal Supraclavicular Lymph Nodes] : normal supraclavicular lymph nodes [Normal] : oriented to person, place and time, with appropriate affect [de-identified] : Right posterior lower scalp without signs of local recurrence.

## 2019-10-30 NOTE — ASSESSMENT
[FreeTextEntry1] : Impression:\par [place] - S/p wide excision of angiosarcoma of scalp with FTSG reconstruction (Dr. Valadez) on 4/10/19\par Completed XRT 7/12/19\par \par Plan:\par RTO q 6 months \par CT chest q6 months x 2 years (next April 2020)\par

## 2020-07-06 ENCOUNTER — APPOINTMENT (OUTPATIENT)
Dept: CT IMAGING | Facility: IMAGING CENTER | Age: 60
End: 2020-07-06
Payer: COMMERCIAL

## 2020-07-06 ENCOUNTER — OUTPATIENT (OUTPATIENT)
Dept: OUTPATIENT SERVICES | Facility: HOSPITAL | Age: 60
LOS: 1 days | End: 2020-07-06
Payer: COMMERCIAL

## 2020-07-06 ENCOUNTER — RESULT REVIEW (OUTPATIENT)
Age: 60
End: 2020-07-06

## 2020-07-06 DIAGNOSIS — Z98.51 TUBAL LIGATION STATUS: Chronic | ICD-10-CM

## 2020-07-06 DIAGNOSIS — C49.0 MALIGNANT NEOPLASM OF CONNECTIVE AND SOFT TISSUE OF HEAD, FACE AND NECK: ICD-10-CM

## 2020-07-06 PROCEDURE — 82565 ASSAY OF CREATININE: CPT

## 2020-07-06 PROCEDURE — 71260 CT THORAX DX C+: CPT

## 2020-07-06 PROCEDURE — 71260 CT THORAX DX C+: CPT | Mod: 26

## 2020-08-04 ENCOUNTER — APPOINTMENT (OUTPATIENT)
Dept: SURGICAL ONCOLOGY | Facility: CLINIC | Age: 60
End: 2020-08-04
Payer: COMMERCIAL

## 2020-08-04 VITALS
HEIGHT: 62 IN | WEIGHT: 142 LBS | OXYGEN SATURATION: 95 % | SYSTOLIC BLOOD PRESSURE: 156 MMHG | BODY MASS INDEX: 26.13 KG/M2 | DIASTOLIC BLOOD PRESSURE: 78 MMHG | HEART RATE: 61 BPM

## 2020-08-04 PROCEDURE — 99214 OFFICE O/P EST MOD 30 MIN: CPT

## 2020-08-04 NOTE — HISTORY OF PRESENT ILLNESS
[de-identified] : Ms. Peralta is a 60 year old female who presents today for her follow up visit.  \par \par Initially presented for consultation on 3/27/19 referred by Dr. Yamilka Ibrahim.  She first noticed a lesion on her parietal scalp approximately 1 year ago denying pruritus or drainage.  Her son prompted her to see a dermatologist after noticing that the lesion felt hard and was tender to palpation.  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  \par \par CT scan from 7/6/2020 found no evidece of metastatic disease.\par \par No significant PMH.\par Denies family history of skin cancers.  Father with history of gastric cancer.\par \par Internist:  Dr. Yaritza Ibrahim [phone]\par Derm: Rob Peralta

## 2020-08-04 NOTE — PHYSICAL EXAM
[Normal] : supple, no neck mass and thyroid not enlarged [Normal Supraclavicular Lymph Nodes] : normal supraclavicular lymph nodes [Normal Neck Lymph Nodes] : normal neck lymph nodes  [Normal] : oriented to person, place and time, with appropriate affect [de-identified] : Right posterior lower scalp without signs of local recurrence.

## 2020-08-04 NOTE — ASSESSMENT
[FreeTextEntry1] : Impression:\par [place] - S/p wide excision of angiosarcoma of scalp with FTSG reconstruction (Dr. Valadez) on 4/10/19\par Completed XRT 7/12/19\par CT chest July 2020 without evidence of disease\par \par Plan:\par RTO q 6 months \par CT chest q6 months x 2 years (next Jan 2021) - ordered\par

## 2021-01-05 ENCOUNTER — RESULT REVIEW (OUTPATIENT)
Age: 61
End: 2021-01-05

## 2021-01-05 ENCOUNTER — OUTPATIENT (OUTPATIENT)
Dept: OUTPATIENT SERVICES | Facility: HOSPITAL | Age: 61
LOS: 1 days | End: 2021-01-05
Payer: COMMERCIAL

## 2021-01-05 ENCOUNTER — APPOINTMENT (OUTPATIENT)
Dept: CT IMAGING | Facility: IMAGING CENTER | Age: 61
End: 2021-01-05
Payer: COMMERCIAL

## 2021-01-05 DIAGNOSIS — C49.0 MALIGNANT NEOPLASM OF CONNECTIVE AND SOFT TISSUE OF HEAD, FACE AND NECK: ICD-10-CM

## 2021-01-05 DIAGNOSIS — Z98.51 TUBAL LIGATION STATUS: Chronic | ICD-10-CM

## 2021-01-05 PROCEDURE — 82565 ASSAY OF CREATININE: CPT

## 2021-01-05 PROCEDURE — 71260 CT THORAX DX C+: CPT

## 2021-01-05 PROCEDURE — 71260 CT THORAX DX C+: CPT | Mod: 26

## 2021-01-25 ENCOUNTER — APPOINTMENT (OUTPATIENT)
Dept: SURGICAL ONCOLOGY | Facility: CLINIC | Age: 61
End: 2021-01-25
Payer: COMMERCIAL

## 2021-01-25 VITALS
RESPIRATION RATE: 16 BRPM | BODY MASS INDEX: 26.31 KG/M2 | TEMPERATURE: 98 F | SYSTOLIC BLOOD PRESSURE: 145 MMHG | HEART RATE: 40 BPM | HEIGHT: 62 IN | WEIGHT: 143 LBS | DIASTOLIC BLOOD PRESSURE: 58 MMHG | OXYGEN SATURATION: 96 %

## 2021-01-25 PROCEDURE — 99072 ADDL SUPL MATRL&STAF TM PHE: CPT

## 2021-01-25 PROCEDURE — 99214 OFFICE O/P EST MOD 30 MIN: CPT

## 2021-01-25 NOTE — PHYSICAL EXAM
[Normal] : supple, no neck mass and thyroid not enlarged [Normal Neck Lymph Nodes] : normal neck lymph nodes  [Normal Supraclavicular Lymph Nodes] : normal supraclavicular lymph nodes [Normal] : oriented to person, place and time, with appropriate affect [de-identified] : well healed right posterior scalp graft site with no evidence of recurrence

## 2021-01-25 NOTE — HISTORY OF PRESENT ILLNESS
[de-identified] : Ms. Peralta is a 60 year old female who presents today for her 6 month follow up visit.   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  Pt. completed adjuvant XRT in July 2019. \par \par CT Chest 1/5/2021- [place]\par \par Denies new skin lesions, masses or bleeding/pruritic moles.  She states the healed area is occasionally itchy.   She has not seen dermatology for a full skin check. \par \par PERTINENT HISTORY:\par Initially presented for consultation on 3/27/19 referred by Dr. Yamilka Ibrahim.  She first noticed a lesion on her parietal scalp approximately 1 year ago denying pruritus or drainage.  Her son prompted her to see a dermatologist after noticing that the lesion felt hard and was tender to palpation.  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  \par \par CT scan from 7/6/2020 found no evidence of metastatic disease.\par \par No significant PMH.\par Denies family history of skin cancers.  Father with history of gastric cancer.\par \par Internist:  Dr. Yaritza Ibrahim [phone]\par Derm: Rob Peralta

## 2021-01-25 NOTE — ASSESSMENT
[FreeTextEntry1] : Impression:\par [place] - S/p wide excision of angiosarcoma of scalp with FTSG reconstruction (Dr. Valadez) on 4/10/19\par Completed XRT 7/12/19\par CT chest January 2021 without evidence of disease\par \par Plan:\par RTO q 6 months \par CT chest July, 2021 then CT yearly \par

## 2021-02-09 ENCOUNTER — APPOINTMENT (OUTPATIENT)
Dept: SURGICAL ONCOLOGY | Facility: CLINIC | Age: 61
End: 2021-02-09

## 2021-07-13 ENCOUNTER — APPOINTMENT (OUTPATIENT)
Dept: CT IMAGING | Facility: IMAGING CENTER | Age: 61
End: 2021-07-13
Payer: COMMERCIAL

## 2021-07-13 ENCOUNTER — OUTPATIENT (OUTPATIENT)
Dept: OUTPATIENT SERVICES | Facility: HOSPITAL | Age: 61
LOS: 1 days | End: 2021-07-13
Payer: COMMERCIAL

## 2021-07-13 DIAGNOSIS — C49.0 MALIGNANT NEOPLASM OF CONNECTIVE AND SOFT TISSUE OF HEAD, FACE AND NECK: ICD-10-CM

## 2021-07-13 DIAGNOSIS — Z98.51 TUBAL LIGATION STATUS: Chronic | ICD-10-CM

## 2021-07-13 PROCEDURE — 82565 ASSAY OF CREATININE: CPT

## 2021-07-13 PROCEDURE — 71260 CT THORAX DX C+: CPT | Mod: 26

## 2021-07-13 PROCEDURE — 71260 CT THORAX DX C+: CPT

## 2021-08-10 ENCOUNTER — APPOINTMENT (OUTPATIENT)
Dept: SURGICAL ONCOLOGY | Facility: CLINIC | Age: 61
End: 2021-08-10
Payer: COMMERCIAL

## 2021-08-10 VITALS
DIASTOLIC BLOOD PRESSURE: 73 MMHG | HEIGHT: 62 IN | WEIGHT: 143 LBS | SYSTOLIC BLOOD PRESSURE: 145 MMHG | OXYGEN SATURATION: 99 % | TEMPERATURE: 98.9 F | BODY MASS INDEX: 26.31 KG/M2 | HEART RATE: 62 BPM

## 2021-08-10 PROCEDURE — 99214 OFFICE O/P EST MOD 30 MIN: CPT

## 2021-08-10 NOTE — PHYSICAL EXAM
[Normal] : supple, no neck mass and thyroid not enlarged [Normal Neck Lymph Nodes] : normal neck lymph nodes  [Normal Supraclavicular Lymph Nodes] : normal supraclavicular lymph nodes [Normal] : oriented to person, place and time, with appropriate affect [de-identified] : Skin graft of posterior scalp  without any local recurrence

## 2021-08-10 NOTE — ASSESSMENT
[FreeTextEntry1] : Impression:\par [place] - S/p wide excision of angiosarcoma of scalp with FTSG reconstruction (Dr. Valadez) on 4/10/19\par Completed XRT 7/12/19\par CT chest July 2021 without evidence of disease\par \par Plan:\par RTO q 6 months \par CT chest yearly (July 2022)\par

## 2021-08-10 NOTE — HISTORY OF PRESENT ILLNESS
[de-identified] : Ms. Peralta is a 61 year old female who presents today for her 6 month follow up visit.   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  Pt. completed adjuvant XRT in July 2019. \par \par CT Chest 1/5/2021- [place]\par CT Chest 7/13/21: No intrathoracic metastatic disease. No change from prior.\par \par \par Denies new skin lesions, masses or bleeding/pruritic moles.  She states the healed area is occasionally itchy.   She has not seen dermatology for a full skin check. \par \par PERTINENT HISTORY:\par Initially presented for consultation on 3/27/19 referred by Dr. Yamilka Ibrahim.  She first noticed a lesion on her parietal scalp approximately 1 year ago denying pruritus or drainage.  Her son prompted her to see a dermatologist after noticing that the lesion felt hard and was tender to palpation.  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  \par \par CT scan from 7/6/2020 found no evidence of metastatic disease.\par \par No significant PMH.\par Denies family history of skin cancers.  Father with history of gastric cancer.\par \par Internist:  Dr. Yaritza Ibrahim [phone]\par Derm: Rob Peralta

## 2022-02-15 ENCOUNTER — APPOINTMENT (OUTPATIENT)
Dept: SURGICAL ONCOLOGY | Facility: CLINIC | Age: 62
End: 2022-02-15
Payer: SELF-PAY

## 2022-02-15 VITALS
WEIGHT: 141 LBS | BODY MASS INDEX: 25.95 KG/M2 | SYSTOLIC BLOOD PRESSURE: 126 MMHG | HEIGHT: 62 IN | HEART RATE: 65 BPM | DIASTOLIC BLOOD PRESSURE: 68 MMHG

## 2022-02-15 VITALS — TEMPERATURE: 97.1 F

## 2022-02-15 PROCEDURE — 99214 OFFICE O/P EST MOD 30 MIN: CPT

## 2022-02-15 NOTE — HISTORY OF PRESENT ILLNESS
[de-identified] : Ms. Peralta is a 61 year old female who presents today for her 6 month follow up visit.   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  Pt. completed adjuvant XRT in July 2019. \par \par CT Chest 1/5/2021- [place]\par CT Chest 7/13/21: No intrathoracic metastatic disease. No change from prior.\par \par Denies new skin lesions, masses or bleeding/pruritic moles.  She states the healed area is occasionally itchy.   She has not seen dermatology for a full skin check. \par \par \par \par PERTINENT HISTORY:\par Initially presented for consultation on 3/27/19 referred by Dr. Yamilka Ibrahim.  She first noticed a lesion on her parietal scalp approximately 1 year ago denying pruritus or drainage.  Her son prompted her to see a dermatologist after noticing that the lesion felt hard and was tender to palpation.  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  \par \par CT scan from 7/6/2020 found no evidence of metastatic disease.\par \par No significant PMH.\par Denies family history of skin cancers.  Father with history of gastric cancer.\par \par Internist:  Dr. Yaritza Ibrahim [phone]\par Derm: Rob Peralta

## 2022-02-15 NOTE — PHYSICAL EXAM
[Normal] : supple, no neck mass and thyroid not enlarged [Normal Neck Lymph Nodes] : normal neck lymph nodes  [Normal Supraclavicular Lymph Nodes] : normal supraclavicular lymph nodes [Normal] : oriented to person, place and time, with appropriate affect [de-identified] : right posterior scalp skin graft clear without any local recurrence; 7 mm area right anterior - sebaceous cyst

## 2022-02-15 NOTE — CONSULT LETTER
[Dear  ___] : Dear  [unfilled], [Courtesy Letter:] : I had the pleasure of seeing your patient, [unfilled], in my office today. [Please see my note below.] : Please see my note below. [Consult Closing:] : Thank you very much for allowing me to participate in the care of this patient.  If you have any questions, please do not hesitate to contact me. [Sincerely,] : Sincerely, [FreeTextEntry1] : I will keep you informed of my follow-up. [FreeTextEntry3] : Yadiel Calixto MD FACS\par Chief of Surgical Oncology\par \par  [DrMaria G  ___] : Dr. MATA

## 2022-06-27 ENCOUNTER — OUTPATIENT (OUTPATIENT)
Dept: OUTPATIENT SERVICES | Facility: HOSPITAL | Age: 62
LOS: 1 days | End: 2022-06-27
Payer: SELF-PAY

## 2022-06-27 ENCOUNTER — APPOINTMENT (OUTPATIENT)
Dept: CT IMAGING | Facility: IMAGING CENTER | Age: 62
End: 2022-06-27

## 2022-06-27 DIAGNOSIS — C49.0 MALIGNANT NEOPLASM OF CONNECTIVE AND SOFT TISSUE OF HEAD, FACE AND NECK: ICD-10-CM

## 2022-06-27 DIAGNOSIS — Z98.51 TUBAL LIGATION STATUS: Chronic | ICD-10-CM

## 2022-06-27 PROCEDURE — 71260 CT THORAX DX C+: CPT

## 2022-06-27 PROCEDURE — 71260 CT THORAX DX C+: CPT | Mod: 26

## 2022-08-01 ENCOUNTER — NON-APPOINTMENT (OUTPATIENT)
Age: 62
End: 2022-08-01

## 2022-08-04 ENCOUNTER — APPOINTMENT (OUTPATIENT)
Dept: SURGICAL ONCOLOGY | Facility: CLINIC | Age: 62
End: 2022-08-04

## 2022-08-04 VITALS
BODY MASS INDEX: 25.95 KG/M2 | TEMPERATURE: 97.1 F | RESPIRATION RATE: 16 BRPM | OXYGEN SATURATION: 98 % | SYSTOLIC BLOOD PRESSURE: 161 MMHG | HEART RATE: 60 BPM | HEIGHT: 62 IN | DIASTOLIC BLOOD PRESSURE: 91 MMHG | WEIGHT: 141 LBS

## 2022-08-04 PROCEDURE — 99214 OFFICE O/P EST MOD 30 MIN: CPT

## 2022-08-04 NOTE — ASSESSMENT
[FreeTextEntry1] : Impression:\par [place] - S/p wide excision of angiosarcoma of scalp with FTSG reconstruction (Dr. Valadez) on 4/10/19\par Completed XRT 7/12/19\par CT chest June 2022 without evidence of disease\par \par Plan:\par RTO q 6 months \par CT chest yearly (June 2023)\par No treatment for sebaceous cyst\par

## 2022-08-04 NOTE — PHYSICAL EXAM
[Normal] : supple, no neck mass and thyroid not enlarged [Normal Neck Lymph Nodes] : normal neck lymph nodes  [Normal Supraclavicular Lymph Nodes] : normal supraclavicular lymph nodes [Normal] : oriented to person, place and time, with appropriate affect [de-identified] : right posterior scalp skin graft clear without any local recurrence; 7 mm area right anterior scalp - sebaceous cyst

## 2022-08-04 NOTE — HISTORY OF PRESENT ILLNESS
[de-identified] : Ms. Peralat is a 62 year old female who presents today for her 6 month follow up visit.   \par \par Initially presented for consultation on 3/27/19 referred by Dr. Yamilka Ibrahim.  She first noticed a lesion on her parietal scalp approximately 1 year ago denying pruritus or drainage.  Her son prompted her to see a dermatologist after noticing that the lesion felt hard and was tender to palpation.  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  Pt. completed adjuvant XRT in July 2019. \par \par CT Chest June 2022- no intrathoracic metastases.\par \par Denies new skin lesions, masses or bleeding/pruritic moles.  She states the healed area is occasionally itchy.   She has not seen dermatology for a full skin check. \par \par Internist:  Dr. Yaritza Ibrahim [phone]\par Derm: Rob Peralta

## 2023-03-16 ENCOUNTER — APPOINTMENT (OUTPATIENT)
Dept: SURGICAL ONCOLOGY | Facility: CLINIC | Age: 63
End: 2023-03-16
Payer: MEDICAID

## 2023-03-16 VITALS
HEIGHT: 62 IN | WEIGHT: 143 LBS | DIASTOLIC BLOOD PRESSURE: 85 MMHG | SYSTOLIC BLOOD PRESSURE: 148 MMHG | RESPIRATION RATE: 16 BRPM | HEART RATE: 61 BPM | BODY MASS INDEX: 26.31 KG/M2 | OXYGEN SATURATION: 98 %

## 2023-03-16 PROCEDURE — 99214 OFFICE O/P EST MOD 30 MIN: CPT

## 2023-03-16 NOTE — HISTORY OF PRESENT ILLNESS
[de-identified] : Ms. Peralta is a 62 year old female who presents today for her 6 month follow up visit.   \par \par Initially presented for consultation on 3/27/19 referred by Dr. Yamilka Ibrahim.  She first noticed a lesion on her parietal scalp approximately 1 year ago denying pruritus or drainage.  Her son prompted her to see a dermatologist after noticing that the lesion felt hard and was tender to palpation.  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  Pt. completed adjuvant XRT in July 2019. \par \par CT Chest June 2022- no intrathoracic metastases.\par \par Denies new skin lesions, masses or bleeding/pruritic moles.  She states the healed area is occasionally itchy.   She has not seen dermatology for a full skin check. \par \par Internist:  Dr. Yaritza Ibrahim [phone]\par Derm: Rob Peralta

## 2023-03-16 NOTE — PHYSICAL EXAM
[Normal] : supple, no neck mass and thyroid not enlarged [Normal Neck Lymph Nodes] : normal neck lymph nodes  [Normal Supraclavicular Lymph Nodes] : normal supraclavicular lymph nodes [Normal] : oriented to person, place and time, with appropriate affect [de-identified] : right posterior scalp area is clear; 1 cm cyst/scar area right frontal scalp ( stable )

## 2023-05-30 NOTE — ASSESSMENT
PEDIATRIC NEUROLOGY     Name: Raman Bailey  Date: 5/30/2023  PCP:  Nila Redding MD    Reason for Visit  Raman Bailey is a 15 year old male who presents for a telehealth visit via live interactive video with a secure connection.    Consent for telehealth visit was verified including risk of electronic data, possibility of equipment failure or need for in person visit if condition cannot be fully assessed by telehealth.     Family/patient were informed that their consent to treat includes permission to submit claims to their insurance on their behalf for the services received and that visit is not to be recorded, stored or photographed.     Clinic Location: [address]    Patient Location: home  Accompanied by mom. Dad via FT private room at work.  Reason for use of telehealth: Minimize risk of potential exposure to viral respiratory illness during COVID-19 pandemic. A comprehensive physical exam could not be performed due to the limitations of telehealth.  Results should be interpreted accordingly.      Initial Consultation  July 2021:  13 year old male with a history of autism who presented to [facility] service with concern for new onset seizure that lasted about 2 minutes, around 3:30 AM, she heard a noise coming from the spare bedroom where patient was sleeping. Mom went in the room and noticed patient laying across the bed with his head hanging to the floor. Dad came and noticed that the patient's jaw was rigid and he was foaming at the mouth. All extremities were noted to be stiff and rigid. Eyes were closed during this episode. No tongue biting or bladder/bowel incontinence. Patient was tired and confused after, stating that he did not know where he was. EMS was called and the patient was transported to [facility] ED. Parents deny any previous seizures. They cannot identify a trigger for this episode.     Patient does complain of a mild right sided headache this  [FreeTextEntry1] : Impression:\par [place] - S/p wide excision of angiosarcoma of scalp with FTSG reconstruction (Dr. Valadez) on 4/10/19\par Completed XRT 7/12/19\par CT chest June 2022 without evidence of disease\par \par Plan:\par RTO q 6 months \par CT chest yearly (June 2023) until 5 years\par No treatment for sebaceous cyst \par  morning. No N/V, dizziness, weakness, fevers, chills.     Course at OSH ED:   · no neurologic deficits were reported. CBC and CMP were unremarkable. CT head was normal.   · Patient was then transferred to [facility].      Course @ [facility]   · video EEG-very frequent bilateral occipital/temporal sharps with a shifting predominance although at times right hemisphere greater than left mainly during sleep or when eyes were closed.  ·  IV Keppra load was given with subsequent improvement in EEG although continued to be abnormal.    · No obvious clinical correlate noted during these times  ·  see EMR  · Baseline problems with his vision. He worked with a therapist in [place] to strengthen his eye muscles but still feel that his spatial relationship is not where it should be. They often see him using his left eye more than his right.      Interval History   August 2021:  Follow-up visit - no seizures,  tolerating keppra w/o issuesotherwise as below    Interval History  October 2021:  Telephone call - Paroxysmal episode (10/8): at school, confused and left one classroom and went to the next, said he thought he heard the bell, was at baseline during that class, couldn't remember seeing the service dog and where his backpack was, didn't say hi to teacher he knew, he didn't know why mom had picked him up earlier, recs made in EMR  Follow-up visit - no additional episodes, on keppra w/o side effects, otherwise as below    Interval History  December 2021:  Telephone call - EEG blood work/Keppra recs made in EMR  Follow-up visit -no additional seizures, tolerating Keppra, otherwise as below    Interval History  March 2022:  · 2 brief episodes where mom noted a brief pause in activity and did not see his face, friend looking at his face felt he was looking past them at a group kids  · At  had to usher him out of room and he pushed her, this is seen at home but not at school before. Has not occurred again  · Tremor - mom  uncertain if bilateral, but def in right and occurred when reaching for cup, taking tablet, eating, when hand is in motion. Last seen in 1 month.     May 2022  Telephone call - blood work/Keppra recs made in EMR  Follow-up visit -  no additional seizures, tolerating Keppra, otherwise as below    Interval History  2022:  Telephone call - summer 2022 vasovagl in dentist recs made in EMR  Follow-up visit - no seiuzres, on keppra w/o issues, otherwise as below    Interval History  May 2023:  Follow-up visit - no seizures until  otherwise as below    Problem List  Seizure Semiology  • Frequency:  2021, 2021, 2023 (onset witnessed by mom/dad)  • Eyes: open  • Focality: left  • Generalization: yes - BEU shaking, BLE stiff  • Visually interruptible: no   • Responsive to verbal command: no  • Incontinence: h/on nightime bed wetting  • Duration: ?/hours  • Visual/Perceptual Aura:  ?  • Acute Txn: Self resolve  • Postictal phase: sweaty, pale, slower and not at baseline x aprox 20-30 mins  • Triggers: puberty, ? Sleep disruption/dad and brother not being home, overseas in [place]/stress from seeing amputee on tv/excited for soccer game  • Seizure Medication: keppra    Cognitive Progress  · Finished 9 th grade: Special ed functions @ 3rd /5thgrade  · Accomodation: IEP, OT, ST, psychologist   · Regression: no    Prior Evaluation  • MRI: 2021 , see EMR  • [facility]: fragile X, MRI lumbar spine - for bed wettign/spina bifida - neg per mom  • REE2021 see EMR  • VEE2021 see EMR  • Ophthalmologist - - exotropia, myopia  • Plasma aminno acids, urine organic acid, lactate, carnitine/acylcarnitine, ammonia, CK, aldolas: 2021 see EMR  • Genetics:   • mid :  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  • BENY: NIFX (seizures) and MHY-7 (cardiac that dad has)   • EE2021  • Cardiology: Dr. Red (2022)    Sleep  Difficulties falling asleep: no  Interrupted sleep:  restless  Snoring: no  Repetitive rhythmical movements:no  Incontinence: occassional, drinking soda    ROS  With all of the above there is no blurry vision, no diplopia, no focal numbness or weakness, no positional quality, no nighttime awakening with emesis, no headache, no photophobia, no phonophobia, no loss of vision, no tinnitus, no dysarthria/slurred speech, no ataxia, no spots/scotomas, no incontinence, no tremors, no nausea, no vomiting, no dizziness, no vertiginous symptoms, no nystagmus, no worsening of symptoms with activity, neurocutaneous stigmata.    Birth History  vacuum-assisted vaginal delivery. Mom believes that his 1 min Apgar score was 1 but is unsure. Does not know 5 min Apgar score. Patient was given O2 because he was \"floppy\". Discharged 2 days after delivery. An MRI of the lumbar spine was done in [place] to assess for spina bifida. Mom reports that this was normal.    Developmental History  Developmentally delayed. He was diagnosed with autism 2 years ago when they moved to the . He receives speech therapy and tutoring services through school.      Past Medical History   autism  seizure    Family History  Mom and brother- MYBPC3 gene - hypertrophic cardio myopathy  DAD - MHY-7  No seizures, developmental delay, early childhood deaths,or other neurological disorders.    Social History     Medications     Allergies    I have reviewed the above information listed in the medical record as obtained by my nursing staff and support staff.    There were no vitals taken for this visit.     Prior Clinic Exam   Physical Exam  Constitutional:       General: He is not in acute distress.     Appearance: He is well-developed. He is not ill-appearing.      Comments: Requires some promptign but bale to complete   HENT:      Head: Atraumatic.      Mouth/Throat:      Mouth: Mucous membranes are moist.   Eyes:      Conjunctiva/sclera: Conjunctivae normal.   Pulmonary:      Effort: Pulmonary effort is normal.  No respiratory distress.   Musculoskeletal:      Cervical back: Normal range of motion.   Skin:     General: Skin is warm and dry.      Capillary Refill: Capillary refill takes less than 2 seconds.   Psychiatric:         Behavior: Behavior normal.       Neurological Exam  Mental Status    Awake, alert, interactive.    Cranial Nerves  Pupils react bilaterally  No nystagmus  Attempts to track and follow,  bilateral exotropia (? right more pronounced than previously)  Symmetric grimace  No obvious facial asymmetry  Good cry and suck.    Motor    Symmetric withdrawal of all extremities spontaneously and in response to pain  Tone and bulk appear to be normal.    Sensory  Symmetric withdrawal of all extremities spontaneously and in response to pain.    Reflexes  Intact upper and lower extremities symmetrically  No clonus  Babinski's equivocal.    Coordination    No head dora  No obvious head tilt  Attempts to reach for objects appropriately without difficulty.    Gait    Appropriate for age.       Exam via Live Video of Patient   Physical Exam  General:  Awake, alert and at baseline per family  Neurological Exam:  Mental Status - Awake, alert, interactive   Cranial nerve - diff to assess no obvious facial asymmetry, unable to assess fundus and remaining cranial nerves  Motor/sensory - Withdrawal of both extremities upper and lower extremities spontaneously and in response to command, unable to perform tri muscle strength exam or assess tone/bulk  Reflexes - unable to asses  Coordination - Reaches appropriately for age, no truncal or appendicular ataxia, attempts FTN and PAULY's  Gait - Able to ambulate independently, not wide based, able to toe/heal walk/hop/tandem gait    Problem List  1. Complex partial seizures evolving to generalized tonic-clonic seizures (CMD)    2. Autism         Impression  Raman is a pleasant 15 year old male with a history of Autism, seizure in setting of genetic mutations  · m.3244 G>A variant  in the MT-TL1 gene at 2% heteroplasmy.  • BENY: NIFX (seizures) and MHY-7 (cardiac that dad has)     He does have an abnormal EEG and paroxysmal epsiod of altered awareness on keppra as documented above.     I have previously  reviewed with family potential etiologies for constellation of symptoms which may include but are not limited to the following: structural abnormalities, vascular insufficiencies, epilepsy syndromes, chromosomal disorders, metabolic disorders/inborn errors of metabolisim, dietary/sleep hygiene, stress/anxiety, infections/stressors to the body, familial tendencies and idiopathic.     Diagnostic and therapeutic options going forward were reviewed.  Family feels comfortable with below recommendations.    Recommendations  · Risks, benefits and side effects of monitoring clinically without medication versus with medication was discussed extensively.  · Keppra 500 mg tabs - 2 QAM and 2.5  QHS   · Pending clinical progress may consider repeat VEEG if needed in the future.  · F/U with genetics as per their recs  · Family keep a log of potential paroxsymal episodes and bring to the next visit.  · If clinically warranted comprehensive therapy program  · Midazolam (Nayzilam) 5 MG/0.1 ML nasal spray to be administered for seizure greater than 3 mins as follows: Administer one spray (5 mg dose) into one nostril. One additional spray (5 mg dose) into the opposite nostril may be administered after 10 minutes if the patient has not responded to the initial dose. Family to check with insurance for coverage.Do not use with rectal diastat/diazepam  Following counseling/EMR handouts were provided: monitoring for changes in neurological exam or symptoms of clinical regression, signs and symptoms to monitor for seizures, limitations of triggers, seizure safety/precautions (may include but not limited to head contact sports, any above ground activity, driving, roller coasters, trampolines, bike riding, swimming,  etc). Red flags discussed.  SUDEP: Epilepsy Foundation  Emphatica/Embrace 2 Watch: https://www.[employer].com  Protective Head Gear: https://www.[employer]/protective-headgear-special  Bed safety: no bunk beds, consideration of  bed rails  · F/U with optho Dr. Palomo - 1/year given concern for monitoring for candice disorderes  · Additional diagnostic and therapeutic options to be considered pending the above.        Return for Follow up with  ([facility]) on 11/10 @ 2 pm.    I have discussed the nature of the condition, course, prognosis, plan, recommendations and outcome extensively with the family    This visit was conducted via telehealth as a precaution due to the Covid-19 pandemic. In person complete physical exam was not possible and this report is based on my conversation with the family.  Results should be interpreted accordingly.    I spent a total of 45 minutes on the day of the visit.  This includes pre-charting, chart review, documenting and referring/communicating with other healthcare professionals.         Maricruz Ledsema MD    Pediatric Neurology     [facility]

## 2023-06-12 ENCOUNTER — APPOINTMENT (OUTPATIENT)
Dept: CT IMAGING | Facility: IMAGING CENTER | Age: 63
End: 2023-06-12
Payer: MEDICAID

## 2023-06-12 ENCOUNTER — OUTPATIENT (OUTPATIENT)
Dept: OUTPATIENT SERVICES | Facility: HOSPITAL | Age: 63
LOS: 1 days | End: 2023-06-12
Payer: MEDICAID

## 2023-06-12 DIAGNOSIS — Z98.51 TUBAL LIGATION STATUS: Chronic | ICD-10-CM

## 2023-06-12 DIAGNOSIS — C49.0 MALIGNANT NEOPLASM OF CONNECTIVE AND SOFT TISSUE OF HEAD, FACE AND NECK: ICD-10-CM

## 2023-06-12 PROCEDURE — 71250 CT THORAX DX C-: CPT

## 2023-06-12 PROCEDURE — 71250 CT THORAX DX C-: CPT | Mod: 26

## 2023-10-12 ENCOUNTER — APPOINTMENT (OUTPATIENT)
Dept: SURGICAL ONCOLOGY | Facility: CLINIC | Age: 63
End: 2023-10-12
Payer: MEDICAID

## 2023-10-12 VITALS
OXYGEN SATURATION: 97 % | WEIGHT: 142 LBS | SYSTOLIC BLOOD PRESSURE: 142 MMHG | HEART RATE: 69 BPM | DIASTOLIC BLOOD PRESSURE: 81 MMHG | HEIGHT: 62 IN | RESPIRATION RATE: 17 BRPM | BODY MASS INDEX: 26.13 KG/M2

## 2023-10-12 PROCEDURE — 99214 OFFICE O/P EST MOD 30 MIN: CPT

## 2024-04-18 NOTE — HISTORY OF PRESENT ILLNESS
[de-identified] : Ms. Peralta is a 64 year old female who presents today for her 6 month follow up visit.     Initially presented for consultation on 3/27/19 referred by Dr. Yamilka Ibrahim.  She first noticed a lesion on her parietal scalp approximately 1 year ago denying pruritus or drainage.  Her son prompted her to see a dermatologist after noticing that the lesion felt hard and was tender to palpation.  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    CT scan from 7/6/2020 found no evidence of metastatic disease.  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  Pt. completed adjuvant XRT in July 2019.   CT Chest June 2023- no intrathoracic metastases.  Denies new skin lesions, masses or bleeding/pruritic moles.  She states the healed area is occasionally itchy.   She has not seen dermatology for a full skin check.   Internist:  Dr. Yaritza Ibrahim [phone] Derm: Rob Peralta

## 2024-04-18 NOTE — ASSESSMENT
[FreeTextEntry1] : Impression: [place] - S/p wide excision of angiosarcoma of scalp with FTSG reconstruction (Dr. Valadez) on 4/10/19 Completed XRT 7/12/19 CT chest June 2023 without evidence of disease  Plan: RTO q 6 months  CT chest yearly (June 2024) until 5 years No treatment for sebaceous cyst

## 2024-04-25 ENCOUNTER — APPOINTMENT (OUTPATIENT)
Dept: SURGICAL ONCOLOGY | Facility: CLINIC | Age: 64
End: 2024-04-25
Payer: COMMERCIAL

## 2024-04-25 VITALS
HEIGHT: 62 IN | HEART RATE: 65 BPM | BODY MASS INDEX: 25.58 KG/M2 | OXYGEN SATURATION: 97 % | DIASTOLIC BLOOD PRESSURE: 90 MMHG | WEIGHT: 139 LBS | RESPIRATION RATE: 16 BRPM | SYSTOLIC BLOOD PRESSURE: 162 MMHG

## 2024-04-25 PROCEDURE — 99214 OFFICE O/P EST MOD 30 MIN: CPT

## 2024-05-13 ENCOUNTER — OUTPATIENT (OUTPATIENT)
Dept: OUTPATIENT SERVICES | Facility: HOSPITAL | Age: 64
LOS: 1 days | End: 2024-05-13

## 2024-05-13 VITALS
RESPIRATION RATE: 16 BRPM | HEIGHT: 62 IN | DIASTOLIC BLOOD PRESSURE: 82 MMHG | HEART RATE: 68 BPM | TEMPERATURE: 98 F | WEIGHT: 139.99 LBS | SYSTOLIC BLOOD PRESSURE: 142 MMHG | OXYGEN SATURATION: 97 %

## 2024-05-13 DIAGNOSIS — I10 ESSENTIAL (PRIMARY) HYPERTENSION: ICD-10-CM

## 2024-05-13 DIAGNOSIS — C49.0 MALIGNANT NEOPLASM OF CONNECTIVE AND SOFT TISSUE OF HEAD, FACE AND NECK: ICD-10-CM

## 2024-05-13 DIAGNOSIS — C44.40 UNSPECIFIED MALIGNANT NEOPLASM OF SKIN OF SCALP AND NECK: Chronic | ICD-10-CM

## 2024-05-13 DIAGNOSIS — C49.9 MALIGNANT NEOPLASM OF CONNECTIVE AND SOFT TISSUE, UNSPECIFIED: ICD-10-CM

## 2024-05-13 DIAGNOSIS — Z98.51 TUBAL LIGATION STATUS: Chronic | ICD-10-CM

## 2024-05-13 LAB
ALBUMIN SERPL ELPH-MCNC: 4.3 G/DL — SIGNIFICANT CHANGE UP (ref 3.3–5)
ALP SERPL-CCNC: 83 U/L — SIGNIFICANT CHANGE UP (ref 40–120)
ALT FLD-CCNC: 19 U/L — SIGNIFICANT CHANGE UP (ref 4–33)
ANION GAP SERPL CALC-SCNC: 13 MMOL/L — SIGNIFICANT CHANGE UP (ref 7–14)
AST SERPL-CCNC: 24 U/L — SIGNIFICANT CHANGE UP (ref 4–32)
BILIRUB SERPL-MCNC: 0.2 MG/DL — SIGNIFICANT CHANGE UP (ref 0.2–1.2)
BUN SERPL-MCNC: 16 MG/DL — SIGNIFICANT CHANGE UP (ref 7–23)
CALCIUM SERPL-MCNC: 9.3 MG/DL — SIGNIFICANT CHANGE UP (ref 8.4–10.5)
CHLORIDE SERPL-SCNC: 107 MMOL/L — SIGNIFICANT CHANGE UP (ref 98–107)
CO2 SERPL-SCNC: 23 MMOL/L — SIGNIFICANT CHANGE UP (ref 22–31)
CREAT SERPL-MCNC: 0.7 MG/DL — SIGNIFICANT CHANGE UP (ref 0.5–1.3)
EGFR: 97 ML/MIN/1.73M2 — SIGNIFICANT CHANGE UP
GLUCOSE SERPL-MCNC: 106 MG/DL — HIGH (ref 70–99)
HCT VFR BLD CALC: 40 % — SIGNIFICANT CHANGE UP (ref 34.5–45)
HGB BLD-MCNC: 13.3 G/DL — SIGNIFICANT CHANGE UP (ref 11.5–15.5)
MCHC RBC-ENTMCNC: 30.1 PG — SIGNIFICANT CHANGE UP (ref 27–34)
MCHC RBC-ENTMCNC: 33.3 GM/DL — SIGNIFICANT CHANGE UP (ref 32–36)
MCV RBC AUTO: 90.5 FL — SIGNIFICANT CHANGE UP (ref 80–100)
NRBC # BLD: 0 /100 WBCS — SIGNIFICANT CHANGE UP (ref 0–0)
NRBC # FLD: 0 K/UL — SIGNIFICANT CHANGE UP (ref 0–0)
PLATELET # BLD AUTO: 270 K/UL — SIGNIFICANT CHANGE UP (ref 150–400)
POTASSIUM SERPL-MCNC: 4 MMOL/L — SIGNIFICANT CHANGE UP (ref 3.5–5.3)
POTASSIUM SERPL-SCNC: 4 MMOL/L — SIGNIFICANT CHANGE UP (ref 3.5–5.3)
PROT SERPL-MCNC: 7.1 G/DL — SIGNIFICANT CHANGE UP (ref 6–8.3)
RBC # BLD: 4.42 M/UL — SIGNIFICANT CHANGE UP (ref 3.8–5.2)
RBC # FLD: 12 % — SIGNIFICANT CHANGE UP (ref 10.3–14.5)
SODIUM SERPL-SCNC: 143 MMOL/L — SIGNIFICANT CHANGE UP (ref 135–145)
WBC # BLD: 4.82 K/UL — SIGNIFICANT CHANGE UP (ref 3.8–10.5)
WBC # FLD AUTO: 4.82 K/UL — SIGNIFICANT CHANGE UP (ref 3.8–10.5)

## 2024-05-13 NOTE — H&P PST ADULT - HISTORY OF PRESENT ILLNESS
Pt is a 64 yr old female scheduled for Resection of Right Scalp Mass with Dr Calixto 5/15/24 - pt hx of excision of angiosarcoma from occipital scalp 5 yrs ago - pt had noted right parietal scalp  lesion several yrs ago - surgeon has evaluated in past - pt now wants to have removed - pt denies pain Hx HTN, Hypothyroid, HLD

## 2024-05-13 NOTE — H&P PST ADULT - NS PRO FEM  PAP SMEARS 3YRS
The patient was referred to us by Angie Espitia for a +FOBT. A copy of this note will be sent to the referring physician.   The patient last had a colonoscopy previously in 2019 as detailed below. There is no FH of colon cancer or colon polyps.   There is no recent history of rectal bleeding. The patient has no pertinent additional complaints of abdominal pain,  diarrhea, bloating,  anorexia or unintentional weight loss.  She has noted new onset constipation. She has been struggling and having to strain to have a BM. It might take her 2 dyas to push out a BM and have a sense of complete evacuation. She has been trying to increase the fiber in the diet.  Regarding any upper GI complaints, the patient has not had heartburn, nausea, vomiting or dysphagia.   The patient does not take blood thinners. She is on HCTZ for HTN.   They deny any CP or LANDEROS. She admits though that she is out of shape.  Summary of prior workup: - Colonoscopy on 6/20/2019 by DR:  5 mm TA in the transverse, 2 benign polyps in the sigmoid, 10 mm HP polyp in the rectum.  Few other polyps in the rectum not removed.  Nonbleeding internal hemorrhoids.  Skin tags noted on perianal exam.  Repeat colonoscopy advised in 5 years
yes

## 2024-05-13 NOTE — H&P PST ADULT - SKIN/BREAST COMMENTS
Hx right parietal scalp skin lesion - pt has had for "years " - hx angiosarcoma removed from occipital scalp 5 yrs ago - pt wants new lesion removed

## 2024-05-13 NOTE — H&P PST ADULT - NSANTHGENDERRD_ENT_A_CORE
[address] 
[phone] Patient: Chantel Prakash MRN: [id number] IES:[id number] About your hospitalization You were admitted on:  August 17, 2018 You last received care in the:  [address] [place] [address] You were discharged on:  August 18, 2018 Why you were hospitalized Your primary diagnosis was:  Not on File Your diagnoses also included:  Cervical Stenosis Of Spine Follow-up Information None Discharge Orders None A check vamsi indicates which time of day the medication should be taken. My Medications START taking these medications Instructions Each Dose to Equal  
 Morning Noon Evening Bedtime  
 diclofenac EC 75 mg EC tablet Commonly known as:  VOLTAREN Your last dose was: Your next dose is: Take 1 Tab by mouth two (2) times a day for 31 days. 75 mg  
    
   
   
   
  
 naloxone 4 mg/actuation nasal spray Commonly known as:  [facility] Your last dose was: Your next dose is:    
   
   
 Use 1 spray intranasally, then discard. Repeat with new spray every 2 min as needed for opioid overdose symptoms, alternating nostrils. oxyCODONE IR 5 mg immediate release tablet Commonly known as:  Yael Cannon Your last dose was: Your next dose is: Take 1-2 Tabs by mouth every four (4) hours as needed for Pain. Max Daily Amount: 60 mg.  
 5-10 mg CONTINUE taking these medications Instructions Each Dose to Equal  
 Morning Noon Evening Bedtime TOPAMAX 100 mg tablet Generic drug:  topiramate Your last dose was: Your next dose is: Take 100 mg by mouth two (2) times a day. 100 mg  
    
   
   
   
  
  
STOP taking these medications   
 oxyCODONE-acetaminophen 5-325 mg per tablet Commonly known as:  PERCOCET  
   
  
  
  
 Where to Get Your Medications Information on where to get these meds will be given to you by the nurse or doctor. ! Ask your nurse or doctor about these medications  
  diclofenac EC 75 mg EC tablet  
 naloxone 4 mg/actuation nasal spray  
 oxyCODONE IR 5 mg immediate release tablet Opioid Education Prescription Opioids: What You Need to Know: 
 
 
Procedure: Procedure(s): 
C6-7 CERVICAL DISC ARTHROPLASTY  (REQ FLIP)  PCP: Dianne Boston MD 
 
Follow up appointments  
-follow up with Dr. Pastor Cody in 2 weeks. Call [phone] to make an appointment as soon as you get home from the hospital. 
 
When to call your Orthopaedic Surgeon: 
-Difficulty swallowing that is worse than when you left the hospital. 
-Signs of infection-if your incision is red; continues to have drainage; drainage has a foul odor or if you have a persistent fever over 101 degrees for 24 hours 
-nausea or vomiting, severe headache 
-changes in sensation in your arms or legs (numbness, tingling, loss of color) 
-increased weakness-greater than before your surgery 
-severe pain or pain not relieved by medications 
-Signs of a blood clot in your leg-calf pain, tenderness, redness, swelling of lower leg When to call your Primary Care Physician: 
-Concerns about medical conditions such as diabetes, high blood pressure, asthma, congestive heart failure 
-Call if blood sugars are elevated, persistent headache or dizziness, coughing or congestion, constipation or diarrhea, burning with urination, abnormal heart rate When to call 911 and go to the nearest emergency room: 
-acute onset of chest pain, shortness of breath, difficulty breathing Activity - You are going home a well person, be as active as possible. Your only exercise should be walking. Start with short frequent walks and increase your walking distance each day. 
-Limit the amount of time you sit to 20-30 minute intervals. Sitting for prolonged periods of time will be uncomfortable for you following surgery. - Do NOT lift anything over 5 pounds 
-From now on, even when lifting light weight, bend with your knees and not your back. 
-Do NOT do any neck exercises until you have been instructed by your doctor 
-When you are in bed, you may lay on your back or on either side. Do NOT lie on your stomach Cervical Collar (Aspen Collar) -You are required to wear your cervical collar for 4 days; except when showering. Stop wearing soft collar day 5. 
- We encourage range of motion of the neck to prevent unwanted fusion. Avoid hyperextension - avoid looking straight up towards the ceiling. 
- Clean the pads on your neck collar every day by hand washing with a mild soap and water. Pat them dry with a towel and lay out to air dry. Do not use heat to dry the pads. Driving 
-You may not drive or return to work until instructed by your physician. However, you may ride in the car for short periods of time. Incision Care Your incision has been closed with absorbable sutures and the Zipline skin closure system. This will assist with healing. The [facility] is to remain on your incision for 2 weeks. A dry dressing (ABD and tape) will be placed over it and should be changed daily, for at least the first several days after your surgery. If you have no incisional drainage, you may leave the incision open to air if you wish, still leaving the Zipline in place. Please make sure to wash your hands prior to touching your dressing. You may take brief showers but do not run the water directly onto the wound. After your shower, blot your incision dry with a soft towel and replace the dry dressing. Do not allow the tape to come in contact with the Zipline. Do not rub or apply any lotions or ointments to your incision site. Do not soak or scrub your wound. The Zipline dressing will be removed during your two week follow-up appointment. If you experience drainage leaking from underneath the Zipline or if it peels off before 2 weeks, please contact your orthopedic surgeons office. Showering 
-You may shower in approximately 2 days after your surgery.   
-Leave the dressing on during your shower. Do NOT allow the water to run directly onto your dressing. Once you get out of the shower, put on a dry dressing. 
-Reminder- Make sure you put clean pads on your collar after your shower.   
-Do not take a tub bath. Preventing blood clots 
-You have been given T.E.D. stockings to wear. Continue to wear these for 7 days after your discharge. Put them on in the morning and take them off at night.   
-They are used to increase your circulation and prevent blood clots from forming in your legs 
-T. E.D. stockings can be machine washed, temperature not to exceed 160° F (71°C) and machine dried for 15 to 20 minutes, temperature not to exceed 250° F (121°C). Pain management 
-Take pain medication as prescribed; decrease the amount you use as your pain lessens 
-Do not wait until you are in extreme pain to take your medication. 
-Avoid alcoholic beverages while taking pain medication Additional medications - Take diclofenac 75 mg twice a day for 1 full month to prevent unwanted fusion Pain Medication Safety DO: 
-Read the Medication Guide  
-Take your medicine exactly as prescribed  
-Store your medicine away from children and in a safe place  
-Flush unused medicine down the toilet -Call your healthcare provider for medical advice about side effects. You may report side effects to FDA at 3-836-FDA-5189.  
-Please be aware that many medications contain Tylenol. We do not want you to over medicate so please read the information below as a guide. Do not take more than 4 Grams of Tylenol in a 24 hour period. (There are 1000 milligrams in one Gram) Percocet contains 325 mg of Tylenol per tablet (do not take more than 12 tablets in 24 hours) Lortab contains 500 mg of Tylenol per tablet (do not take more than 8 tablets in 24 hours) Norco contains 325 mg of Tylenol per tablet (do not take more than 12 tablets in 24 hours). DO NOT: 
-Do not give your medicine to others  
-Do not take medicine unless it was prescribed for you  
-Do not stop taking your medicine without talking to your healthcare provider  
-Do not break, chew, crush, dissolve, or inject your medicine. If you cannot  swallow your medicine whole, talk to your healthcare provider. 
-Do not drink alcohol while taking this medicine 
-Do not take anti-inflammatory medications or aspirin unless instructed by your     Physician. Diet 
-resume usual diet; drink plenty of fluids; eat foods high in fiber 
-It is important to have regular bowel movements. Pain medications may cause constipation. You may want to take a stool softener (such as Senokot-S or Colace) to prevent constipation. If constipation occurs, take a laxative (such as Dulcolax tablets, Milk of Magnesia, or a suppository). Laxatives should only be used if the above preventable measures have failed and you still have not had a bowel movement after three days. Introducing [facility]!    
 [facility] introduces [employer] patient portal. Now you can access parts of your medical record, email your doctor's office, and request medication refills online. 1. In your internet browser, go to [email or web address] 2. Click on the First Time User? Click Here link in the Sign In box. You will see the New Member Sign Up page. 3. Enter your Pro Player Connect Access Code exactly as it appears below. You will not need to use this code after youve completed the sign-up process. If you do not sign up before the expiration date, you must request a new code. · Pro Player Connect Access Code: [email or web address] Expires: 11/4/2018 10:25 AM 
 
4. Enter the last four digits of your Social Security Number (xxxx) and Date of Birth (mm/dd/yyyy) as indicated and click Submit. You will be taken to the next sign-up page. 5. Create a Pro Player Connect ID. This will be your Pro Player Connect login ID and cannot be changed, so think of one that is secure and easy to remember. 6. Create a Pro Player Connect password. You can change your password at any time. 7. Enter your Password Reset Question and Answer. This can be used at a later time if you forget your password. 8. Enter your e-mail address. You will receive e-mail notification when new information is available in [address] 9. Click Sign Up. You can now view and download portions of your medical record. 10. Click the Download Summary menu link to download a portable copy of your medical information. If you have questions, please visit the Frequently Asked Questions section of the Pro Player Connect website. Remember, Pro Player Connect is NOT to be used for urgent needs. For medical emergencies, dial 911. Now available from your iPhone and Android! Introducing Herb Castillo As a [facility] patient, I wanted to make you aware of our electronic visit tool called Herb Castillo. [facility] 24/7 allows you to connect within minutes with a medical provider 24 hours a day, seven days a week via a mobile device or tablet or logging into a secure website from your computer. You can access Climateminder from anywhere in the [place]. A virtual visit might be right for you when you have a simple condition and feel like you just dont want to get out of bed, or cant get away from work for an appointment, when your regular Lola Pineda provider is not available (evenings, weekends or holidays), or when youre out of town and need minor care. Electronic visits cost only $49 and if the Lola [facility] 24/7 provider determines a prescription is needed to treat your condition, one can be electronically transmitted to a nearby pharmacy*. Please take a moment to enroll today if you have not already done so. The enrollment process is free and takes just a few minutes. To enroll, please download the [email or web address] anselmo to your tablet or phone, or visit [email or web address] to enroll on your computer. And, as an [address] [place] [address] patient with a [employer] account, the results of your visits will be scanned into your electronic medical record and your primary care provider will be able to view the scanned results. We urge you to continue to see your regular Lola Pineda provider for your ongoing medical care. And while your primary care provider may not be the one available when you seek a Climateminder virtual visit, the peace of mind you get from getting a real diagnosis real time can be priceless. For more information on Climateminder, view our Frequently Asked Questions (FAQs) at [email or web address]. Sincerely, 
 
Halie Laura MD 
Chief Medical Officer Codi8 Audrey Farfan *:  certain medications cannot be prescribed via Climateminder Unresulted Labs-Please follow up with your PCP about these lab tests Order Current Status XR FLUOROSCOPY OVER 60 MINUTES In process Providers Seen During Your Hospitalization Provider Specialty Primary office phone Savannah Lima MD Orthopedic Surgery [phone] Your Primary Care Physician (PCP) Primary Care Physician Office Phone Office Fax Jennie Argueta [phone] You are allergic to the following Allergen Reactions Morphine Itching ITCHING, FEELING OF HEAT Other Medication Other (comments) \"SSRIs cause panic attack and hot/itchy skin\" Wellbutrin (Bupropion Hcl) Hives Recent Documentation Height Weight BMI OB Status Smoking Status 1.664 m 83.5 kg 30.15 kg/m2 Having regular periods Current Every Day Smoker Emergency Contacts Name Discharge Info Relation Home Work Mobile Wayne Houseron DISCHARGE CAREGIVER [3] Boyfriend [17]   [phone] Tigist Corral DISCHARGE CAREGIVER [3] Parent [1] [phone] Patient Belongings The following personal items are in your possession at time of discharge: 
  Dental Appliances: None Please provide this summary of care documentation to your next provider. Signatures-by signing, you are acknowledging that this After Visit Summary has been reviewed with you and you have received a copy. Patient Signature:  ____________________________________________________________ Date:  ____________________________________________________________  
  
Nahomy Townsend Provider Signature:  ____________________________________________________________ Date:  ____________________________________________________________ No

## 2024-05-13 NOTE — H&P PST ADULT - NSICDXPASTMEDICALHX_GEN_ALL_CORE_FT
PAST MEDICAL HISTORY:  Angiosarcoma of scalp dx: 3/2019    HLD (hyperlipidemia)     HTN (hypertension)     Hypothyroid     Liver cyst     Multiparity      (normal spontaneous vaginal delivery) X 2    Soft tissue malignant neoplasm

## 2024-05-14 ENCOUNTER — TRANSCRIPTION ENCOUNTER (OUTPATIENT)
Age: 64
End: 2024-05-14

## 2024-05-14 VITALS
OXYGEN SATURATION: 97 % | DIASTOLIC BLOOD PRESSURE: 75 MMHG | TEMPERATURE: 98 F | HEART RATE: 53 BPM | HEIGHT: 62 IN | WEIGHT: 139.99 LBS | SYSTOLIC BLOOD PRESSURE: 148 MMHG | RESPIRATION RATE: 18 BRPM

## 2024-05-14 NOTE — ASU PREOPERATIVE ASSESSMENT, ADULT (IPARK ONLY) - FALL HARM RISK - UNIVERSAL INTERVENTIONS
Bed in lowest position, wheels locked, appropriate side rails in place/Call bell, personal items and telephone in reach/Instruct patient to call for assistance before getting out of bed or chair/Non-slip footwear when patient is out of bed/[place] to call system/Physically safe environment - no spills, clutter or unnecessary equipment/Purposeful Proactive Rounding/Room/bathroom lighting operational, light cord in reach

## 2024-05-15 ENCOUNTER — OUTPATIENT (OUTPATIENT)
Dept: OUTPATIENT SERVICES | Facility: HOSPITAL | Age: 64
LOS: 1 days | Discharge: HOME | End: 2024-05-15
Payer: COMMERCIAL

## 2024-05-15 ENCOUNTER — RESULT REVIEW (OUTPATIENT)
Age: 64
End: 2024-05-15

## 2024-05-15 ENCOUNTER — APPOINTMENT (OUTPATIENT)
Dept: SURGICAL ONCOLOGY | Facility: AMBULATORY SURGERY CENTER | Age: 64
End: 2024-05-15

## 2024-05-15 ENCOUNTER — TRANSCRIPTION ENCOUNTER (OUTPATIENT)
Age: 64
End: 2024-05-15

## 2024-05-15 VITALS
TEMPERATURE: 98 F | DIASTOLIC BLOOD PRESSURE: 71 MMHG | RESPIRATION RATE: 15 BRPM | HEART RATE: 81 BPM | OXYGEN SATURATION: 96 % | SYSTOLIC BLOOD PRESSURE: 110 MMHG

## 2024-05-15 DIAGNOSIS — C49.0 MALIGNANT NEOPLASM OF CONNECTIVE AND SOFT TISSUE OF HEAD, FACE AND NECK: ICD-10-CM

## 2024-05-15 DIAGNOSIS — C44.40 UNSPECIFIED MALIGNANT NEOPLASM OF SKIN OF SCALP AND NECK: Chronic | ICD-10-CM

## 2024-05-15 DIAGNOSIS — Z98.51 TUBAL LIGATION STATUS: Chronic | ICD-10-CM

## 2024-05-15 PROCEDURE — 21015 RESECT FACE/SCALP TUM < 2 CM: CPT

## 2024-05-15 PROCEDURE — 14020 TIS TRNFR S/A/L 10 SQ CM/<: CPT

## 2024-05-15 PROCEDURE — 88305 TISSUE EXAM BY PATHOLOGIST: CPT | Mod: 26

## 2024-05-15 RX ORDER — OXYCODONE HYDROCHLORIDE 5 MG/1
1 TABLET ORAL
Qty: 5 | Refills: 0
Start: 2024-05-15

## 2024-05-15 NOTE — ASU DISCHARGE PLAN (ADULT/PEDIATRIC) - ASU DC SPECIAL INSTRUCTIONSFT
WOUND CARE:  You should apply bacitracin to the incision daily. Please do not Scrub or rub incisions. Do not use lotion or powder on incisions. Do not remove the outer stitches, they will be removed at follow-up visit.  BATHING: You may shower and/or sponge bathe starting tomorrow. You may use warm soapy water in the shower and rinse, pat dry.   PAIN: You may take over-the-counter medications for pain. You make take Tylenol orally every 6 hours as needed. Do not exceed 4,000mg a day. You may take ibuprofen every 6 hours. You may alternate between the two for better pain control. You have been prescribed narcotics for pain management. Please take as prescribed on pill bottle, AS NEEDED, for BREAKTHROUGH pain ONLY. If you are taking narcotic pain medication DO NOT drive a car, operate machinery or make important decisions.  MEDICATIONS: Please continue home medications as prescribed.  DIET: Return to your usual diet.      NOTIFY YOUR SURGEON IF YOU HAVE: any bleeding that does not stop, any pus draining from your wound(s), any fever (over 100.4 F) persistent nausea/vomiting, inability to urinate, or if your pain is not controlled on your discharge pain medications.     FOLLOW UP:  Please follow up with your surgeon Dr. Calixto 2 weeks after discharge. Please call the office to schedule the appointment or if you have any questions.

## 2024-05-15 NOTE — ASU DISCHARGE PLAN (ADULT/PEDIATRIC) - CARE PROVIDER_API CALL
Yadiel Calixto  Surgery  [address] [place] [id number]  Phone: [phone]  Fax: [phone]  Follow Up Time: 2 weeks

## 2024-05-15 NOTE — ASU DISCHARGE PLAN (ADULT/PEDIATRIC) - BATHING
You can start showering tomorrow./Do not submerge in water You can start showering tomorrow./Shower only/Do not submerge in water

## 2024-05-15 NOTE — BRIEF OPERATIVE NOTE - NSICDXBRIEFPROCEDURE_GEN_ALL_CORE_FT
PROCEDURES:  Excision of benign skin lesion of scalp, 2.1 cm to 3.0 cm 15-May-2024 16:02:14  Sarai Montenegro

## 2024-05-15 NOTE — ASU PREOP CHECKLIST - LATEX ALLERGY
Pt calling to set up a new pt appointment. She is needing to establish an OB/GYN. She states she has intermittent cramping as well. Pt moved here from [place] after delivering her previous 2 kids.  there. She is thinking about pregnancy, but denied pregnancy today. New pt appointment made   no

## 2024-05-15 NOTE — ASU DISCHARGE PLAN (ADULT/PEDIATRIC) - NS MD DC FALL RISK RISK
For information on Fall & Injury Prevention, visit: [email or web address] OR  [email or web address]/[email or web address]-injury OR  https://www.cdc.gov/steadi/patient.html

## 2024-05-16 PROBLEM — C49.9 MALIGNANT NEOPLASM OF CONNECTIVE AND SOFT TISSUE, UNSPECIFIED: Chronic | Status: ACTIVE | Noted: 2024-05-13

## 2024-05-17 PROBLEM — I10 ESSENTIAL (PRIMARY) HYPERTENSION: Chronic | Status: ACTIVE | Noted: 2024-05-13

## 2024-05-17 PROBLEM — E03.9 HYPOTHYROIDISM, UNSPECIFIED: Chronic | Status: ACTIVE | Noted: 2024-05-13

## 2024-05-17 PROBLEM — K76.89 OTHER SPECIFIED DISEASES OF LIVER: Chronic | Status: ACTIVE | Noted: 2024-05-13

## 2024-05-17 PROBLEM — E78.5 HYPERLIPIDEMIA, UNSPECIFIED: Chronic | Status: ACTIVE | Noted: 2024-05-13

## 2024-05-24 LAB — SURGICAL PATHOLOGY STUDY: SIGNIFICANT CHANGE UP

## 2024-05-28 ENCOUNTER — APPOINTMENT (OUTPATIENT)
Dept: SURGICAL ONCOLOGY | Facility: CLINIC | Age: 64
End: 2024-05-28
Payer: COMMERCIAL

## 2024-05-28 VITALS
OXYGEN SATURATION: 97 % | HEART RATE: 69 BPM | SYSTOLIC BLOOD PRESSURE: 118 MMHG | BODY MASS INDEX: 25.76 KG/M2 | WEIGHT: 140 LBS | HEIGHT: 62 IN | DIASTOLIC BLOOD PRESSURE: 72 MMHG

## 2024-05-28 PROCEDURE — 99024 POSTOP FOLLOW-UP VISIT: CPT

## 2024-05-28 NOTE — CONSULT LETTER
[Dear  ___] : Dear  [unfilled], [Courtesy Letter:] : I had the pleasure of seeing your patient, [unfilled], in my office today. [Please see my note below.] : Please see my note below. [Consult Closing:] : Thank you very much for allowing me to participate in the care of this patient.  If you have any questions, please do not hesitate to contact me. [Sincerely,] : Sincerely, [FreeTextEntry1] : I will keep you informed of the final pathology of the re-excision. [FreeTextEntry3] : Yadiel Calixto MD FACS Chief of Surgical Oncology

## 2024-05-28 NOTE — ASSESSMENT
[FreeTextEntry1] : Impression: [place] - S/p wide excision of angiosarcoma of scalp with FTSG reconstruction (Dr. Valadez) on 4/10/19 Completed XRT 7/12/19 CT chest June 2023 without evidence of disease  She is now status post radical resection of right scalp lesion on 5/15/2024. Final pathology: angiosarcoma   Plan: Reresection of right anterior scalp angiosarcoma to establish negative margins CT chest scheduled 6/12/2/024

## 2024-05-28 NOTE — HISTORY OF PRESENT ILLNESS
[de-identified] : Ms. Peralta is a 64 year old female who presents today for a postop visit.   Initially presented for consultation on 3/27/19 referred by Dr. Yamilka Ibrahim.  She first noticed a lesion on her parietal scalp approximately 1 year ago denying pruritus or drainage.  Her son prompted her to see a dermatologist after noticing that the lesion felt hard and was tender to palpation.  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    CT scan from 7/6/2020 found no evidence of metastatic disease.  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  Pt. completed adjuvant XRT in July 2019.   CT Chest June 2023- no intrathoracic metastases.  Denies new skin lesions, masses or bleeding/pruritic moles.  She states the healed area is occasionally itchy.   She has not seen dermatology for a full skin check.   the patient has a right scalp lesion which has been present for many years but is concerned about it  She is now status post radical resection of right scalp lesion on 5/15/2024.  Final pathology: angiosarcoma with positive margins  Internist:  Dr. Yaritza Ibrahim [phone] Derm: Rob Peralta

## 2024-05-28 NOTE — REASON FOR VISIT
[Post-Op] : a post-op for [FreeTextEntry2] : status post radical resection of right scalp lesion on 5/15/2024

## 2024-06-04 ENCOUNTER — TRANSCRIPTION ENCOUNTER (OUTPATIENT)
Age: 64
End: 2024-06-04

## 2024-06-05 ENCOUNTER — APPOINTMENT (OUTPATIENT)
Dept: SURGICAL ONCOLOGY | Facility: AMBULATORY SURGERY CENTER | Age: 64
End: 2024-06-05

## 2024-06-05 ENCOUNTER — OUTPATIENT (OUTPATIENT)
Dept: OUTPATIENT SERVICES | Facility: HOSPITAL | Age: 64
LOS: 1 days | Discharge: HOME | End: 2024-06-05
Payer: COMMERCIAL

## 2024-06-05 ENCOUNTER — RESULT REVIEW (OUTPATIENT)
Age: 64
End: 2024-06-05

## 2024-06-05 ENCOUNTER — TRANSCRIPTION ENCOUNTER (OUTPATIENT)
Age: 64
End: 2024-06-05

## 2024-06-05 VITALS — SYSTOLIC BLOOD PRESSURE: 140 MMHG | OXYGEN SATURATION: 97 % | HEART RATE: 64 BPM | DIASTOLIC BLOOD PRESSURE: 85 MMHG

## 2024-06-05 VITALS
OXYGEN SATURATION: 98 % | HEART RATE: 66 BPM | RESPIRATION RATE: 18 BRPM | DIASTOLIC BLOOD PRESSURE: 72 MMHG | HEIGHT: 62 IN | SYSTOLIC BLOOD PRESSURE: 189 MMHG | WEIGHT: 139.99 LBS | TEMPERATURE: 98 F

## 2024-06-05 DIAGNOSIS — C49.0 MALIGNANT NEOPLASM OF CONNECTIVE AND SOFT TISSUE OF HEAD, FACE AND NECK: ICD-10-CM

## 2024-06-05 DIAGNOSIS — Z98.51 TUBAL LIGATION STATUS: Chronic | ICD-10-CM

## 2024-06-05 DIAGNOSIS — C44.40 UNSPECIFIED MALIGNANT NEOPLASM OF SKIN OF SCALP AND NECK: Chronic | ICD-10-CM

## 2024-06-05 PROCEDURE — 88342 IMHCHEM/IMCYTCHM 1ST ANTB: CPT | Mod: 26

## 2024-06-05 PROCEDURE — 88309 TISSUE EXAM BY PATHOLOGIST: CPT | Mod: 26

## 2024-06-05 PROCEDURE — 14020 TIS TRNFR S/A/L 10 SQ CM/<: CPT | Mod: 58

## 2024-06-05 PROCEDURE — 21016 RESECT FACE/SCALP TUM 2 CM/>: CPT | Mod: 58

## 2024-06-05 PROCEDURE — 88341 IMHCHEM/IMCYTCHM EA ADD ANTB: CPT | Mod: 26

## 2024-06-05 PROCEDURE — 88360 TUMOR IMMUNOHISTOCHEM/MANUAL: CPT | Mod: 26,59

## 2024-06-05 NOTE — BRIEF OPERATIVE NOTE - NSICDXBRIEFPROCEDURE_GEN_ALL_CORE_FT
PROCEDURES:  Excision of malignant lesion of scalp, 1.6 to 2.0cm 05-Jun-2024 13:23:36  Philip Gregg

## 2024-06-05 NOTE — ASU PREOPERATIVE ASSESSMENT, ADULT ([facility] ONLY) - FALL HARM RISK - UNIVERSAL INTERVENTIONS
Bed in lowest position, wheels locked, appropriate side rails in place/Call bell, personal items and telephone in reach/Instruct patient to call for assistance before getting out of bed or chair/Non-slip footwear when patient is out of bed/[place] to call system/Physically safe environment - no spills, clutter or unnecessary equipment/Purposeful Proactive Rounding/Room/bathroom lighting operational, light cord in reach

## 2024-06-05 NOTE — ASU DISCHARGE PLAN (ADULT/PEDIATRIC) - CARE PROVIDER_API CALL
Yadiel Calixto  Surgery  [address] [place] [id number]  Phone: [phone]  Fax: [phone]  Follow Up Time: 2 weeks

## 2024-06-05 NOTE — ASU DISCHARGE PLAN (ADULT/PEDIATRIC) - MODE OF TRANSPORTATION
[facility] Hospitalist Progress Note        Subjective:  Seen and examined.  Patient more awake alert oriented x3.  Speech is clear.  Denies any focal weakness.  Denies chest pain, dyspnea.  Going for MRI.  Discussed with JUANCHO Fletcehr.      Physical Exam  Blood pressure 122/70, pulse 62, temperature 98.4 °F (36.9 °C), temperature source Oral, resp. rate 16, height 5' 2\" (1.575 m), weight 81.7 kg (180 lb 1.9 oz), SpO2 97 %.     Constitutional: She is well-developed, well-nourished, and in no distress. No distress.   HENT:   Head: Normocephalic and atraumatic.   Eyes: Pupils are equal, round, and reactive to light. Conjunctivae and EOM are normal.   Neck: Normal range of motion. Neck supple. No thyromegaly present.   Cardiovascular: Normal rate, regular rhythm and normal heart sounds.   Pulmonary/Chest: Effort normal and breath sounds normal. No respiratory distress.   Abdominal: Soft. Bowel sounds are normal. She exhibits no distension. There is no abdominal tenderness.   Musculoskeletal: Normal range of motion.         General: No edema.   Neurological: She is alert. She has normal sensation, normal strength, normal reflexes and intact cranial nerves. She is  oriented and speech is clear.  No cranial nerve deficit or sensory deficit.   No focal deficit.  Skin: Skin is warm and dry. No rash noted. She is not diaphoretic.   Psychiatric: Affect normal.        Current Medications:  Current Facility-Administered Medications   Medication   • magnesium oxide (MAG-OX) tablet Tab 400 mg   • insulin lispro (HumaLOG) scheduled dose AND correction dose   • dextrose 50 % injection 25 g   • dextrose 50 % injection 12.5 g   • dextrose 5 % infusion   • glucagon (GLUCAGEN) injection 1 mg   • dextrose (GLUTOSE) 40 % gel 15 g   • dextrose (GLUTOSE) 40 % gel 30 g   • acetaminophen (TYLENOL) tablet 650 mg    Or   • acetaminophen (TYLENOL) suppository 650 mg   • sodium chloride (NORMAL SALINE) 0.9 % bolus 500 mL   • nitroGLYcerin (NITROSTAT)  sublingual tablet 0.4 mg   • Potassium Standard Replacement Protocol   • Magnesium Standard Replacement Protocol   • ondansetron (ZOFRAN) injection 4 mg   • polyethylene glycol (MIRALAX) packet 17 g   • docusate sodium-sennosides (SENOKOT S) 50-8.6 MG 2 tablet   • aluminum-magnesium hydroxide-simethicone (MAALOX) 200-200-20 MG/5ML suspension 30 mL   • sodium chloride 0.9 % flush bag 25 mL   • enoxaparin (LOVENOX) injection 40 mg   • insulin lispro (HumaLOG) scheduled dose AND correction dose   • sodium chloride 0.9% infusion   • clopidogrel (PLAVIX) tablet 75 mg   • aspirin chewable 81 mg   • influenza virus quadrivalent vaccine inactivated (PRESERVATIVE FREE) injection 0.5 mL   • amLODIPine (NORVASC) tablet 2.5 mg   • losartan (COZAAR) tablet 50 mg   • atorvastatin (LIPITOR) tablet 20 mg   • latanoprost (XALATAN) 0.005 % ophthalmic solution 1 drop         Labs  Recent Labs   Lab 11/01/20  0530 10/31/20  1750   WBC 8.5 9.1   HGB 11.7* 13.0   HCT 35.7* 40.1   MCV 96.2 96.4   MCH 31.5 31.3   MCHC 32.8 32.4    272       Recent Labs   Lab 11/01/20  1450 11/01/20  0530 10/31/20  1750   SODIUM  --  142 138   POTASSIUM 3.7 3.2* 3.3*   CHLORIDE  --  106 101   CO2  --  28 27   BUN  --  10 10   CREATININE  --  0.78 0.66   GLUCOSE  --  98 256*   CALCIUM  --  7.8* 8.5   AST  --  10 15   GPT  --  15 16       Recent Labs   Lab 10/31/20  1750   RAPDTR <0.02   PT 10.7   INR 1.0        Recent Labs   Lab 11/01/20  0530 10/31/20  1750   ALKPT 61 82   BILIRUBIN 0.8 0.8   ALBUMIN 3.1* 3.7   GPT 15 16   AST 10 15         Microbiology Results     None           Imaging  MRI BRAIN WO CONTRAST   Final Result      1.  No acute infarct.   2.  Nonspecific focal FLAIR abnormality within the left medial temporal cortex.  No associated hemorrhage, restricted diffusion, mass effect, or volume loss.  These findings may be related to encephalitis (such as autoimmune), other nonspecific    infectious/inflammatory process, sequela of prior  seizure, cortical malformation, or possibly malignancy, amongst other etiologies.   3.  Remote microhemorrhage within the ed may be related to hypertensive/cerebral amyloid angiopathy or possibly underlying cavernoma.   4.  Right posterior communicating artery aneurysm.   5.  Additional findings as described above.      Electronically Signed by: KATARINA BALBUENA M.D.    Signed on: 11/1/2020 3:53 PM          XR CHEST PA OR AP 1 VIEW   Final Result   FINDINGS/IMPRESSION: Portable upright AP view is submitted.  Cardiac silhouette is enlarged.  Overlapping monitoring wires are on the chest.  Biapical pleural thickening.  Visualized lung zones are otherwise clear.  Thoracic aorta is tortuous and    atherosclerotic.         Electronically Signed by: EUGENIE HERNÁNDEZ D.O.    Signed on: 10/31/2020 6:46 PM          CTA HEAD W CONTRAST   Final Result   1.  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     2.  No flowing stenosis or abrupt arterial occlusion about the [place].     3.  Sinus disease as described on prior CT brain.            Electronically Signed by: EUGENIE HERNÁNDEZ D.O.    Signed on: 10/31/2020 6:34 PM          CTA NECK W CONTRAST   Final Result   1.  No CTA findings of hemodynamically significant and carotid or vertebral artery stenosis.  No carotid or vertebral artery dissection.     2.  Other findings as above.         The Stenosis Measured Uses the Measurements of the Distal Internal Carotid Arteries as the Denominator for the Stenosis Measurement.      Electronically Signed by: EUGENIE HERNÁNDEZ D.O.    Signed on: 10/31/2020 7:42 PM          CT HEAD WO CONTRAST   Final Result   1.  Age-appropriate involutional change.   2.  Mild white matter disease compatible with age indeterminate small vessel ischemic angiopathy.   3.  Dr. Duran was notified of the findings by telephone at 6:13 PM on 10/31/2020         Noncontrast CT is a screening survey.  Conditions such as vascular malformations,  aneurysms, low-grade tumors, meningitis, subtle subarachnoid hemorrhages, etc., may not be demonstrated.  Follow-up studies as clinically indicated may be necessary.            Electronically Signed by: EUGENIE HERNÁNDEZ D.O.    Signed on: 10/31/2020 6:16 PM              ECHO:  No results found for this or any previous visit (from the past 8760 hours).     Assessment and Plan:  89 year old woman here with confusion, expressive aphasia. This AM was fine, did errands with her son - awake, alert, oriented, coherent. Around 3 pm was \"not feeling well\" primarily complaining fo weakness, and took a nap. At 5 pm woke up and was slurring her words, incoherent, not making much sense, weak, slumping in her chair      Recurrent TIAs:  Has history of possible prior TIA on Plavix  CT/CTA with small aneurysm, no acute ischemia or bleed noted  CTA neck without dissection or stenosis  Teleneuro with NIHSS 4 on presentation not candidate for tPA  Given high risk TIA/minor stroke will start DAPT    continue Plavix  UA slightly abnormal cultures in process.  Continue statin  PT/OT/ST  Telemetry  BP and glucose control.  MRI showing right posterior communicating aneurysm.  Echocardiogram pending.  Event monitor upon discharge.    Type 2 diabetes mellitus without complication, without long-term current use of insulin (CMS/[facility])  Insulin TID plus correctional scale. Accuchecks.     Essential hypertension  Amlodipine, losartan    Breast cancer (CMS/[facility])  In remission    Dyslipidemia  Statin        DVT PPX:  Current Active Medications for DVT Prophylaxis (From admission, onward)         Stop     enoxaparin (LOVENOX) injection 40 mg  40 mg,   Subcutaneous,   DAILY      --                Code Status:    Code Status Information     Code Status    Do Not Resuscitate    Modified Resuscitation Specifics:    Patient is DNR: Yes     Intubation: No     Antiarrhythmics: No     Cardioversion: No     Vasopressor: No            Dispo: Echocardiogram  pending.  EEG tomorrow.  Will change to inpatient.  PT and OT to evaluate the patient.        Rashard Jean MD   11/1/2020   Ambulatory

## 2024-06-12 ENCOUNTER — NON-APPOINTMENT (OUTPATIENT)
Age: 64
End: 2024-06-12

## 2024-06-17 ENCOUNTER — APPOINTMENT (OUTPATIENT)
Dept: CT IMAGING | Facility: IMAGING CENTER | Age: 64
End: 2024-06-17
Payer: COMMERCIAL

## 2024-06-17 ENCOUNTER — OUTPATIENT (OUTPATIENT)
Dept: OUTPATIENT SERVICES | Facility: HOSPITAL | Age: 64
LOS: 1 days | End: 2024-06-17
Payer: COMMERCIAL

## 2024-06-17 ENCOUNTER — APPOINTMENT (OUTPATIENT)
Dept: SURGICAL ONCOLOGY | Facility: CLINIC | Age: 64
End: 2024-06-17
Payer: COMMERCIAL

## 2024-06-17 VITALS
RESPIRATION RATE: 17 BRPM | HEIGHT: 62 IN | DIASTOLIC BLOOD PRESSURE: 73 MMHG | BODY MASS INDEX: 25.76 KG/M2 | HEART RATE: 65 BPM | OXYGEN SATURATION: 99 % | SYSTOLIC BLOOD PRESSURE: 129 MMHG | WEIGHT: 140 LBS

## 2024-06-17 DIAGNOSIS — C49.0 MALIGNANT NEOPLASM OF CONNECTIVE AND SOFT TISSUE OF HEAD, FACE AND NECK: ICD-10-CM

## 2024-06-17 DIAGNOSIS — Z98.51 TUBAL LIGATION STATUS: Chronic | ICD-10-CM

## 2024-06-17 PROCEDURE — 71260 CT THORAX DX C+: CPT | Mod: 26

## 2024-06-17 PROCEDURE — 99024 POSTOP FOLLOW-UP VISIT: CPT

## 2024-06-17 PROCEDURE — 71260 CT THORAX DX C+: CPT

## 2024-06-17 NOTE — PHYSICAL EXAM
[Normal] : well developed, well nourished, in no acute distress [de-identified] : right scalp excision site healed nicely; sutures removed today

## 2024-06-17 NOTE — ASSESSMENT
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

## 2024-06-17 NOTE — HISTORY OF PRESENT ILLNESS
[de-identified] : Ms. ALISIA SO is a 64 year old woman who presents today for a postop visit.   Initially presented for consultation on 3/27/19 referred by Dr. Yamilka Ibrahim.  She first noticed a lesion on her parietal scalp approximately 1 year ago denying pruritus or drainage.  Her son prompted her to see a dermatologist after noticing that the lesion felt hard and was tender to palpation.  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    CT scan from 7/6/2020 found no evidence of metastatic disease.  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  Pt. completed adjuvant XRT in July 2019.   CT Chest June 2023- no intrathoracic metastases.  Denies new skin lesions, masses or bleeding/pruritic moles.  She states the healed area is occasionally itchy.   She has not seen dermatology for a full skin check.   the patient has a right scalp lesion which has been present for many years but is concerned about it  She is now status post radical resection of right scalp lesion on 5/15/2024.  Final pathology: angiosarcoma with positive margins  re-resection of scalp on 6/5/2024 - final path not available but prelim shows positive margins  Internist:  Dr. Yaritza Ibrahim [phone] Derm: Rob So

## 2024-06-17 NOTE — REASON FOR VISIT
[Follow-Up Visit] : a follow-up visit for [FreeTextEntry2] : status post radical resection of right scalp lesion on 5/15/2024

## 2024-06-19 LAB — SURGICAL PATHOLOGY STUDY: SIGNIFICANT CHANGE UP

## 2024-06-26 ENCOUNTER — OUTPATIENT (OUTPATIENT)
Dept: OUTPATIENT SERVICES | Facility: HOSPITAL | Age: 64
LOS: 1 days | End: 2024-06-26
Payer: COMMERCIAL

## 2024-06-26 VITALS
HEIGHT: 60.5 IN | WEIGHT: 138.01 LBS | RESPIRATION RATE: 16 BRPM | SYSTOLIC BLOOD PRESSURE: 157 MMHG | HEART RATE: 72 BPM | TEMPERATURE: 97 F | OXYGEN SATURATION: 98 % | DIASTOLIC BLOOD PRESSURE: 78 MMHG

## 2024-06-26 DIAGNOSIS — I49.9 CARDIAC ARRHYTHMIA, UNSPECIFIED: ICD-10-CM

## 2024-06-26 DIAGNOSIS — C49.0 MALIGNANT NEOPLASM OF CONNECTIVE AND SOFT TISSUE OF HEAD, FACE AND NECK: ICD-10-CM

## 2024-06-26 DIAGNOSIS — C44.40 UNSPECIFIED MALIGNANT NEOPLASM OF SKIN OF SCALP AND NECK: Chronic | ICD-10-CM

## 2024-06-26 DIAGNOSIS — I10 ESSENTIAL (PRIMARY) HYPERTENSION: ICD-10-CM

## 2024-06-26 DIAGNOSIS — C49.9 MALIGNANT NEOPLASM OF CONNECTIVE AND SOFT TISSUE, UNSPECIFIED: ICD-10-CM

## 2024-06-26 DIAGNOSIS — E03.9 HYPOTHYROIDISM, UNSPECIFIED: ICD-10-CM

## 2024-06-26 DIAGNOSIS — Z98.51 TUBAL LIGATION STATUS: Chronic | ICD-10-CM

## 2024-06-26 PROCEDURE — 93010 ELECTROCARDIOGRAM REPORT: CPT

## 2024-06-26 RX ORDER — OLMESARTAN MEDOXOMIL 5 MG/1
1 TABLET, FILM COATED ORAL
Refills: 0 | DISCHARGE

## 2024-06-26 RX ORDER — LEVOTHYROXINE SODIUM 125 MCG
1 TABLET ORAL
Refills: 0 | DISCHARGE

## 2024-06-26 RX ORDER — ATORVASTATIN CALCIUM 80 MG/1
1 TABLET, FILM COATED ORAL
Refills: 0 | DISCHARGE

## 2024-07-01 ENCOUNTER — APPOINTMENT (OUTPATIENT)
Dept: SURGICAL ONCOLOGY | Facility: CLINIC | Age: 64
End: 2024-07-01
Payer: COMMERCIAL

## 2024-07-01 VITALS
SYSTOLIC BLOOD PRESSURE: 125 MMHG | HEIGHT: 62 IN | BODY MASS INDEX: 25.76 KG/M2 | RESPIRATION RATE: 17 BRPM | HEART RATE: 74 BPM | DIASTOLIC BLOOD PRESSURE: 77 MMHG | WEIGHT: 140 LBS | OXYGEN SATURATION: 97 %

## 2024-07-01 PROBLEM — C49.0: Status: ACTIVE | Noted: 2019-03-27

## 2024-07-01 PROCEDURE — 99024 POSTOP FOLLOW-UP VISIT: CPT

## 2024-07-04 ENCOUNTER — TRANSCRIPTION ENCOUNTER (OUTPATIENT)
Age: 64
End: 2024-07-04

## 2024-07-05 ENCOUNTER — OUTPATIENT (OUTPATIENT)
Dept: OUTPATIENT SERVICES | Facility: HOSPITAL | Age: 64
LOS: 1 days | Discharge: HOME | End: 2024-07-05
Payer: COMMERCIAL

## 2024-07-05 ENCOUNTER — RESULT REVIEW (OUTPATIENT)
Age: 64
End: 2024-07-05

## 2024-07-05 ENCOUNTER — APPOINTMENT (OUTPATIENT)
Dept: PLASTIC SURGERY | Facility: HOSPITAL | Age: 64
End: 2024-07-05

## 2024-07-05 ENCOUNTER — APPOINTMENT (OUTPATIENT)
Dept: SURGICAL ONCOLOGY | Facility: AMBULATORY SURGERY CENTER | Age: 64
End: 2024-07-05

## 2024-07-05 ENCOUNTER — TRANSCRIPTION ENCOUNTER (OUTPATIENT)
Age: 64
End: 2024-07-05

## 2024-07-05 VITALS
RESPIRATION RATE: 16 BRPM | WEIGHT: 138.01 LBS | OXYGEN SATURATION: 98 % | DIASTOLIC BLOOD PRESSURE: 67 MMHG | HEART RATE: 61 BPM | SYSTOLIC BLOOD PRESSURE: 174 MMHG | HEIGHT: 60.5 IN | TEMPERATURE: 98 F

## 2024-07-05 VITALS
OXYGEN SATURATION: 98 % | HEART RATE: 75 BPM | SYSTOLIC BLOOD PRESSURE: 148 MMHG | RESPIRATION RATE: 12 BRPM | DIASTOLIC BLOOD PRESSURE: 76 MMHG

## 2024-07-05 DIAGNOSIS — C49.0 MALIGNANT NEOPLASM OF CONNECTIVE AND SOFT TISSUE OF HEAD, FACE AND NECK: ICD-10-CM

## 2024-07-05 DIAGNOSIS — C44.40 UNSPECIFIED MALIGNANT NEOPLASM OF SKIN OF SCALP AND NECK: Chronic | ICD-10-CM

## 2024-07-05 DIAGNOSIS — Z98.51 TUBAL LIGATION STATUS: Chronic | ICD-10-CM

## 2024-07-05 PROCEDURE — 88342 IMHCHEM/IMCYTCHM 1ST ANTB: CPT | Mod: 26

## 2024-07-05 PROCEDURE — 88309 TISSUE EXAM BY PATHOLOGIST: CPT | Mod: 26

## 2024-07-05 PROCEDURE — 14301 TIS TRNFR ANY 30.1-60 SQ CM: CPT

## 2024-07-05 PROCEDURE — 21016 RESECT FACE/SCALP TUM 2 CM/>: CPT | Mod: 58

## 2024-07-05 PROCEDURE — 15241 FTH/GFT F/C/C/M/N/A/G/H/F EA: CPT

## 2024-07-05 PROCEDURE — 15240 FTH/GFT F/C/C/M/N/AX/G/H/F20: CPT

## 2024-07-05 PROCEDURE — 88341 IMHCHEM/IMCYTCHM EA ADD ANTB: CPT | Mod: 26

## 2024-07-05 RX ORDER — OXYCODONE HYDROCHLORIDE 100 MG/5ML
1 SOLUTION ORAL
Qty: 10 | Refills: 0
Start: 2024-07-05

## 2024-07-05 RX ORDER — CEFADROXIL 500 MG
1 CAPSULE ORAL
Qty: 20 | Refills: 0
Start: 2024-07-05 | End: 2024-07-14

## 2024-07-15 ENCOUNTER — APPOINTMENT (OUTPATIENT)
Dept: PLASTIC SURGERY | Facility: CLINIC | Age: 64
End: 2024-07-15

## 2024-07-15 VITALS
DIASTOLIC BLOOD PRESSURE: 70 MMHG | TEMPERATURE: 97.7 F | HEIGHT: 62 IN | SYSTOLIC BLOOD PRESSURE: 151 MMHG | BODY MASS INDEX: 25.76 KG/M2 | HEART RATE: 66 BPM | WEIGHT: 140 LBS | OXYGEN SATURATION: 95 %

## 2024-07-15 PROCEDURE — 99024 POSTOP FOLLOW-UP VISIT: CPT

## 2024-07-16 LAB — SURGICAL PATHOLOGY STUDY: SIGNIFICANT CHANGE UP

## 2024-07-22 ENCOUNTER — APPOINTMENT (OUTPATIENT)
Dept: PLASTIC SURGERY | Facility: CLINIC | Age: 64
End: 2024-07-22

## 2024-07-22 VITALS
TEMPERATURE: 98 F | OXYGEN SATURATION: 96 % | BODY MASS INDEX: 25.76 KG/M2 | SYSTOLIC BLOOD PRESSURE: 158 MMHG | DIASTOLIC BLOOD PRESSURE: 82 MMHG | HEIGHT: 62 IN | HEART RATE: 60 BPM | WEIGHT: 140 LBS

## 2024-07-22 DIAGNOSIS — C49.0 MALIGNANT NEOPLASM OF CONNECTIVE AND SOFT TISSUE OF HEAD, FACE AND NECK: ICD-10-CM

## 2024-07-22 PROCEDURE — 99024 POSTOP FOLLOW-UP VISIT: CPT

## 2024-07-25 PROBLEM — C49.0: Status: ACTIVE | Noted: 2024-07-25

## 2024-07-25 NOTE — HISTORY OF PRESENT ILLNESS
[FreeTextEntry1] : Pt s/p radical resection of angiosarcoma of scalp with skin graft closure.  Pt doing well no complaints.

## 2024-07-25 NOTE — PHYSICAL EXAM
[NI] : Normal [de-identified] : Bolster intact no sign of infection no erythema.  Bolster removed skin graft viable healing well no sign of infection

## 2024-07-25 NOTE — PHYSICAL EXAM
[NI] : Normal [de-identified] : Bolster intact no sign of infection no erythema.  Bolster removed skin graft viable healing well no sign of infection

## 2024-07-25 NOTE — REASON FOR VISIT
[Post Op: _________] : a [unfilled] post op visit [Family Member] : family member [FreeTextEntry1] :  s/p; reconstruction of scalp wound, dos:7/5/24. Patient reports she is doing well, she denies having bleeding, drainage, fever or chills.

## 2024-07-29 ENCOUNTER — APPOINTMENT (OUTPATIENT)
Dept: SURGICAL ONCOLOGY | Facility: CLINIC | Age: 64
End: 2024-07-29
Payer: COMMERCIAL

## 2024-07-29 DIAGNOSIS — C49.0 MALIGNANT NEOPLASM OF CONNECTIVE AND SOFT TISSUE OF HEAD, FACE AND NECK: ICD-10-CM

## 2024-07-29 PROCEDURE — 99024 POSTOP FOLLOW-UP VISIT: CPT

## 2024-07-29 NOTE — HISTORY OF PRESENT ILLNESS
[de-identified] : Ms. ALISIA SO is a 64 year old woman who presents today for a postop visit.   Initially presented for consultation on 3/27/19 referred by Dr. Yamilka Ibrahim.  She first noticed a lesion on her parietal scalp approximately 1 year ago denying pruritus or drainage.  Her son prompted her to see a dermatologist after noticing that the lesion felt hard and was tender to palpation.  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    CT scan from 7/6/2020 found no evidence of metastatic disease.  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  Pt. completed adjuvant XRT in July 2019.   CT Chest June 2023- no intrathoracic metastases.  the patient has a right scalp lesion which has been present for many years but is concerned about it She is now status post radical resection of right scalp lesion on 5/15/2024.  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 Nelson)  - residual cutaneous angiosarcoma, at least 6 cm, involving dermis & subq tissue, deep & lateral margins positive   Internist:  Dr. Yaritza Ibrahim [phone] Derm: Rob So

## 2024-07-29 NOTE — PHYSICAL EXAM
[Normal] : well developed, well nourished, in no acute distress [de-identified] : right scalp skin graft healing nicely

## 2024-07-29 NOTE — PHYSICAL EXAM
[Normal] : well developed, well nourished, in no acute distress [de-identified] : right scalp skin graft healing nicely

## 2024-07-29 NOTE — PHYSICAL EXAM
[Normal] : well developed, well nourished, in no acute distress [de-identified] : right scalp skin graft healing nicely

## 2024-07-29 NOTE — HISTORY OF PRESENT ILLNESS
[de-identified] : Ms. ALISIA SO is a 64 year old woman who presents today for a postop visit.   Initially presented for consultation on 3/27/19 referred by Dr. Yamilka Ibrahim.  She first noticed a lesion on her parietal scalp approximately 1 year ago denying pruritus or drainage.  Her son prompted her to see a dermatologist after noticing that the lesion felt hard and was tender to palpation.  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    CT scan from 7/6/2020 found no evidence of metastatic disease.  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  Pt. completed adjuvant XRT in July 2019.   CT Chest June 2023- no intrathoracic metastases.  the patient has a right scalp lesion which has been present for many years but is concerned about it She is now status post radical resection of right scalp lesion on 5/15/2024.  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 Nelson)  - residual cutaneous angiosarcoma, at least 6 cm, involving dermis & subq tissue, deep & lateral margins positive   Internist:  Dr. Yaritza Ibrahim [phone] Derm: Rob So

## 2024-07-29 NOTE — ASSESSMENT
[FreeTextEntry1] : IMP: S/p wide excision of angiosarcoma of posterior scalp with FTSG reconstruction (Dr. 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 Nelson)  - residual cutaneous angiosarcoma, at least 6 cm, involving dermis & subq tissue, deep & lateral margins positive   PLAN: re-excision of positive margins w/ plastics closure for FTSG

## 2024-07-29 NOTE — HISTORY OF PRESENT ILLNESS
[de-identified] : Ms. ALISIA SO is a 64 year old woman who presents today for a postop visit.   Initially presented for consultation on 3/27/19 referred by Dr. Yamilka Ibrahim.  She first noticed a lesion on her parietal scalp approximately 1 year ago denying pruritus or drainage.  Her son prompted her to see a dermatologist after noticing that the lesion felt hard and was tender to palpation.  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    CT scan from 7/6/2020 found no evidence of metastatic disease.  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  Pt. completed adjuvant XRT in July 2019.   CT Chest June 2023- no intrathoracic metastases.  the patient has a right scalp lesion which has been present for many years but is concerned about it She is now status post radical resection of right scalp lesion on 5/15/2024.  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 Nelson)  - residual cutaneous angiosarcoma, at least 6 cm, involving dermis & subq tissue, deep & lateral margins positive   Internist:  Dr. Yaritza Ibrahim [phone] Derm: Rob So

## 2024-07-29 NOTE — REASON FOR VISIT
family [Follow-Up Visit] : a follow-up visit for [FreeTextEntry2] : resection of scalp angiosarcoma

## 2024-08-06 ENCOUNTER — TRANSCRIPTION ENCOUNTER (OUTPATIENT)
Age: 64
End: 2024-08-06

## 2024-08-06 ENCOUNTER — APPOINTMENT (OUTPATIENT)
Dept: PLASTIC SURGERY | Facility: CLINIC | Age: 64
End: 2024-08-06

## 2024-08-06 PROCEDURE — 99024 POSTOP FOLLOW-UP VISIT: CPT

## 2024-08-06 NOTE — HISTORY OF PRESENT ILLNESS
[de-identified] : Ms. ALISIA SO is a 64 year old woman who presents today for a postop visit.   Initially presented for consultation on 3/27/19 referred by Dr. Yamilka Ibrahim.  She first noticed a lesion on her parietal scalp approximately 1 year ago denying pruritus or drainage.  Her son prompted her to see a dermatologist after noticing that the lesion felt hard and was tender to palpation.  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    CT scan from 7/6/2020 found no evidence of metastatic disease.  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  Pt. completed adjuvant XRT in July 2019.   CT Chest June 2023- no intrathoracic metastases.  the patient has a right scalp lesion which has been present for many years but is concerned about it She is now status post radical resection of right scalp lesion on 5/15/2024.  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 Nelson)  - residual cutaneous angiosarcoma, at least 6 cm, involving dermis & subq tissue, deep & lateral margins positive   Internist:  Dr. Yaritza Ibrahim [phone] Derm: Rob So

## 2024-08-06 NOTE — REASON FOR VISIT
[Post Op: _________] : a [unfilled] post op visit [FreeTextEntry1] : s/p; reconstruction of scalp wound, dos:7/5/24. Patient reports she is doing well, she denies having bleeding, drainage, fever or chills.

## 2024-08-06 NOTE — PHYSICAL EXAM
[Normal] : well developed, well nourished, in no acute distress [de-identified] : right scalp skin graft healing nicely

## 2024-08-13 ENCOUNTER — APPOINTMENT (OUTPATIENT)
Dept: SURGICAL ONCOLOGY | Facility: CLINIC | Age: 64
End: 2024-08-13

## 2024-09-16 ENCOUNTER — NON-APPOINTMENT (OUTPATIENT)
Age: 64
End: 2024-09-16

## (undated) DEVICE — VENODYNE/SCD SLEEVE CALF MEDIUM

## (undated) DEVICE — SUT SURGIPRO II 4-0 18" P-12

## (undated) DEVICE — PREP CHLORAPREP HI-LITE ORANGE 26ML

## (undated) DEVICE — DRAPE 3/4 SHEET 52X76"

## (undated) DEVICE — DRAPE TOWEL BLUE 17" X 24"

## (undated) DEVICE — DRSG KLING 4"

## (undated) DEVICE — DRSG STERISTRIPS 0.5 X 4"

## (undated) DEVICE — DRSG STOCKINETTE IMPERVIOUS XL

## (undated) DEVICE — WARMING BLANKET UPPER ADULT

## (undated) DEVICE — ELCTR ROCKER SWITCH PENCIL BLUE 10FT

## (undated) DEVICE — DRSG ADAPTIC 3 X 8"

## (undated) DEVICE — DRAPE INSTRUMENT POUCH 6.75" X 11"

## (undated) DEVICE — SUT VICRYL 3-0 27" SH

## (undated) DEVICE — POSITIONER FOAM EGG CRATE ULNAR 2PCS (PINK)

## (undated) DEVICE — DRAPE 1/2 SHEET 40X57"

## (undated) DEVICE — MARKING PEN W RULER

## (undated) DEVICE — DRSG TEGADERM 1.75X1.75"

## (undated) DEVICE — DRSG COBAN 4"

## (undated) DEVICE — BLADE SCALPEL SAFETYLOCK #15

## (undated) DEVICE — DRSG OPSITE 13.75 X 4"

## (undated) DEVICE — DRSG TEGADERM 6"X8"

## (undated) DEVICE — GLV 7 PROTEXIS (WHITE)

## (undated) DEVICE — PACK MINOR NO DRAPE

## (undated) DEVICE — ELCTR BOVIE TIP BLADE INSULATED 4" EDGE

## (undated) DEVICE — WARMING BLANKET LOWER ADULT

## (undated) DEVICE — SOL IRR POUR H2O 500ML

## (undated) DEVICE — SUT VICRYL 2-0 27" CT-1

## (undated) DEVICE — CAM-ESU 1501228: Type: DURABLE MEDICAL EQUIPMENT

## (undated) DEVICE — ELCTR GROUNDING PAD ADULT COVIDIEN

## (undated) DEVICE — STAPLER SKIN VISI-STAT 35 WIDE

## (undated) DEVICE — DRSG COMBINE 5X9"

## (undated) DEVICE — SUT MONOCRYL 4-0 27" PS-2 UNDYED

## (undated) DEVICE — SOL IRR POUR NS 0.9% 500ML

## (undated) DEVICE — DRSG STOCKINETTE IMPERVIOUS MED

## (undated) DEVICE — GLV 7.5 PROTEXIS (WHITE)

## (undated) DEVICE — SUT SILK 2-0 18" FS

## (undated) DEVICE — DRAPE SPLIT SHEET 77" X 108"

## (undated) DEVICE — DRSG XEROFORM 5 X 9"

## (undated) DEVICE — SUT NYLON 2-0 18" FS

## (undated) DEVICE — SUT POLYSORB 3-0 30" V-20 UNDYED

## (undated) DEVICE — DRSG CURITY GAUZE SPONGE 4 X 4" 12-PLY